# Patient Record
Sex: MALE | Race: WHITE | Employment: FULL TIME | URBAN - METROPOLITAN AREA
[De-identification: names, ages, dates, MRNs, and addresses within clinical notes are randomized per-mention and may not be internally consistent; named-entity substitution may affect disease eponyms.]

---

## 2021-12-15 ENCOUNTER — APPOINTMENT (OUTPATIENT)
Dept: CT IMAGING | Age: 56
End: 2021-12-15

## 2021-12-15 ENCOUNTER — APPOINTMENT (OUTPATIENT)
Dept: GENERAL RADIOLOGY | Age: 56
End: 2021-12-15

## 2021-12-15 ENCOUNTER — HOSPITAL ENCOUNTER (EMERGENCY)
Age: 56
Discharge: ANOTHER HOSPITAL | End: 2021-12-16

## 2021-12-15 DIAGNOSIS — R59.1 LYMPHADENOPATHY: ICD-10-CM

## 2021-12-15 DIAGNOSIS — J84.10 GRANULOMATOUS LUNG DISEASE (HCC): ICD-10-CM

## 2021-12-15 DIAGNOSIS — R10.9 ABDOMINAL PAIN, UNSPECIFIED ABDOMINAL LOCATION: ICD-10-CM

## 2021-12-15 DIAGNOSIS — J34.89 MASS OF SPHENOID SINUS: Primary | ICD-10-CM

## 2021-12-15 DIAGNOSIS — K92.0 HEMATEMESIS, PRESENCE OF NAUSEA NOT SPECIFIED: ICD-10-CM

## 2021-12-15 LAB
ALBUMIN SERPL-MCNC: 4.2 GM/DL (ref 3.4–5)
ALP BLD-CCNC: 72 IU/L (ref 40–129)
ALT SERPL-CCNC: 14 U/L (ref 10–40)
ANION GAP SERPL CALCULATED.3IONS-SCNC: 10 MMOL/L (ref 4–16)
APTT: 30.6 SECONDS (ref 25.1–37.1)
AST SERPL-CCNC: 22 IU/L (ref 15–37)
BASOPHILS ABSOLUTE: 0.1 K/CU MM
BASOPHILS RELATIVE PERCENT: 0.5 % (ref 0–1)
BILIRUB SERPL-MCNC: 0.2 MG/DL (ref 0–1)
BUN BLDV-MCNC: 12 MG/DL (ref 6–23)
CALCIUM SERPL-MCNC: 9.3 MG/DL (ref 8.3–10.6)
CHLORIDE BLD-SCNC: 99 MMOL/L (ref 99–110)
CO2: 24 MMOL/L (ref 21–32)
CREAT SERPL-MCNC: 1.1 MG/DL (ref 0.9–1.3)
DIFFERENTIAL TYPE: ABNORMAL
EOSINOPHILS ABSOLUTE: 0.1 K/CU MM
EOSINOPHILS RELATIVE PERCENT: 0.4 % (ref 0–3)
GFR AFRICAN AMERICAN: >60 ML/MIN/1.73M2
GFR NON-AFRICAN AMERICAN: >60 ML/MIN/1.73M2
GLUCOSE BLD-MCNC: 116 MG/DL (ref 70–99)
HCT VFR BLD CALC: 46.8 % (ref 42–52)
HEMOGLOBIN: 14.8 GM/DL (ref 13.5–18)
IMMATURE NEUTROPHIL %: 0.4 % (ref 0–0.43)
INR BLD: 0.92 INDEX
LYMPHOCYTES ABSOLUTE: 1.8 K/CU MM
LYMPHOCYTES RELATIVE PERCENT: 13.9 % (ref 24–44)
MCH RBC QN AUTO: 25.9 PG (ref 27–31)
MCHC RBC AUTO-ENTMCNC: 31.6 % (ref 32–36)
MCV RBC AUTO: 81.8 FL (ref 78–100)
MONOCYTES ABSOLUTE: 1.6 K/CU MM
MONOCYTES RELATIVE PERCENT: 12.2 % (ref 0–4)
NUCLEATED RBC %: 0 %
PDW BLD-RTO: 18 % (ref 11.7–14.9)
PLATELET # BLD: 495 K/CU MM (ref 140–440)
PMV BLD AUTO: 9.5 FL (ref 7.5–11.1)
POTASSIUM SERPL-SCNC: 4.4 MMOL/L (ref 3.5–5.1)
PROTHROMBIN TIME: 11.8 SECONDS (ref 11.7–14.5)
RBC # BLD: 5.72 M/CU MM (ref 4.6–6.2)
SEGMENTED NEUTROPHILS ABSOLUTE COUNT: 9.3 K/CU MM
SEGMENTED NEUTROPHILS RELATIVE PERCENT: 72.6 % (ref 36–66)
SODIUM BLD-SCNC: 133 MMOL/L (ref 135–145)
TOTAL IMMATURE NEUTOROPHIL: 0.05 K/CU MM
TOTAL NUCLEATED RBC: 0 K/CU MM
TOTAL PROTEIN: 7.5 GM/DL (ref 6.4–8.2)
TROPONIN T: <0.01 NG/ML
WBC # BLD: 12.8 K/CU MM (ref 4–10.5)

## 2021-12-15 PROCEDURE — 96375 TX/PRO/DX INJ NEW DRUG ADDON: CPT

## 2021-12-15 PROCEDURE — 85730 THROMBOPLASTIN TIME PARTIAL: CPT

## 2021-12-15 PROCEDURE — 6360000002 HC RX W HCPCS: Performed by: PHYSICIAN ASSISTANT

## 2021-12-15 PROCEDURE — 74177 CT ABD & PELVIS W/CONTRAST: CPT

## 2021-12-15 PROCEDURE — C9113 INJ PANTOPRAZOLE SODIUM, VIA: HCPCS | Performed by: PHYSICIAN ASSISTANT

## 2021-12-15 PROCEDURE — 80053 COMPREHEN METABOLIC PANEL: CPT

## 2021-12-15 PROCEDURE — 84484 ASSAY OF TROPONIN QUANT: CPT

## 2021-12-15 PROCEDURE — 85610 PROTHROMBIN TIME: CPT

## 2021-12-15 PROCEDURE — 6360000004 HC RX CONTRAST MEDICATION: Performed by: PHYSICIAN ASSISTANT

## 2021-12-15 PROCEDURE — 99285 EMERGENCY DEPT VISIT HI MDM: CPT

## 2021-12-15 PROCEDURE — 85025 COMPLETE CBC W/AUTO DIFF WBC: CPT

## 2021-12-15 PROCEDURE — 71275 CT ANGIOGRAPHY CHEST: CPT

## 2021-12-15 PROCEDURE — 96374 THER/PROPH/DIAG INJ IV PUSH: CPT

## 2021-12-15 PROCEDURE — 71045 X-RAY EXAM CHEST 1 VIEW: CPT

## 2021-12-15 PROCEDURE — 2580000003 HC RX 258: Performed by: PHYSICIAN ASSISTANT

## 2021-12-15 PROCEDURE — 83690 ASSAY OF LIPASE: CPT

## 2021-12-15 PROCEDURE — 70450 CT HEAD/BRAIN W/O DYE: CPT

## 2021-12-15 PROCEDURE — 36415 COLL VENOUS BLD VENIPUNCTURE: CPT

## 2021-12-15 RX ORDER — DIPHENHYDRAMINE HYDROCHLORIDE 50 MG/ML
50 INJECTION INTRAMUSCULAR; INTRAVENOUS ONCE
Status: COMPLETED | OUTPATIENT
Start: 2021-12-15 | End: 2021-12-15

## 2021-12-15 RX ORDER — 0.9 % SODIUM CHLORIDE 0.9 %
1000 INTRAVENOUS SOLUTION INTRAVENOUS ONCE
Status: COMPLETED | OUTPATIENT
Start: 2021-12-15 | End: 2021-12-16

## 2021-12-15 RX ORDER — METOCLOPRAMIDE HYDROCHLORIDE 5 MG/ML
10 INJECTION INTRAMUSCULAR; INTRAVENOUS ONCE
Status: COMPLETED | OUTPATIENT
Start: 2021-12-15 | End: 2021-12-15

## 2021-12-15 RX ORDER — PANTOPRAZOLE SODIUM 40 MG/10ML
40 INJECTION, POWDER, LYOPHILIZED, FOR SOLUTION INTRAVENOUS ONCE
Status: COMPLETED | OUTPATIENT
Start: 2021-12-15 | End: 2021-12-15

## 2021-12-15 RX ADMIN — IOPAMIDOL 80 ML: 755 INJECTION, SOLUTION INTRAVENOUS at 23:13

## 2021-12-15 RX ADMIN — PANTOPRAZOLE SODIUM 40 MG: 40 INJECTION, POWDER, FOR SOLUTION INTRAVENOUS at 21:55

## 2021-12-15 RX ADMIN — METOCLOPRAMIDE 10 MG: 5 INJECTION, SOLUTION INTRAMUSCULAR; INTRAVENOUS at 21:55

## 2021-12-15 RX ADMIN — SODIUM CHLORIDE 1000 ML: 9 INJECTION, SOLUTION INTRAVENOUS at 21:52

## 2021-12-15 RX ADMIN — DIPHENHYDRAMINE HYDROCHLORIDE 50 MG: 50 INJECTION, SOLUTION INTRAMUSCULAR; INTRAVENOUS at 21:56

## 2021-12-15 ASSESSMENT — PAIN SCALES - GENERAL: PAINLEVEL_OUTOF10: 9

## 2021-12-15 ASSESSMENT — PAIN DESCRIPTION - LOCATION: LOCATION: HEAD

## 2021-12-15 NOTE — ED PROVIDER NOTES
12 lead EKG per my interpretation:  Sinus bradycardia at 58  Axis is   Normal  QTc is  within an acceptable range  There is no specific T wave changes appreciated. There is no specific ST wave changes appreciated. No STEMI    Prior EKG to compare with was NOT available.     MD Alexa Bañuelos MD  12/15/21 0317

## 2021-12-16 ENCOUNTER — HOSPITAL ENCOUNTER (INPATIENT)
Age: 56
LOS: 6 days | Discharge: HOME | DRG: 143 | End: 2021-12-22
Attending: INTERNAL MEDICINE | Admitting: INTERNAL MEDICINE

## 2021-12-16 VITALS
SYSTOLIC BLOOD PRESSURE: 139 MMHG | RESPIRATION RATE: 16 BRPM | HEART RATE: 72 BPM | HEIGHT: 73 IN | OXYGEN SATURATION: 95 % | TEMPERATURE: 97.6 F | WEIGHT: 200 LBS | DIASTOLIC BLOOD PRESSURE: 90 MMHG | BODY MASS INDEX: 26.51 KG/M2

## 2021-12-16 PROBLEM — E23.6 PITUITARY MASS (HCC): Status: ACTIVE | Noted: 2021-12-16

## 2021-12-16 LAB — LIPASE: 44 IU/L (ref 13–60)

## 2021-12-16 PROCEDURE — 96376 TX/PRO/DX INJ SAME DRUG ADON: CPT

## 2021-12-16 PROCEDURE — 2000000000 HC ICU R&B

## 2021-12-16 PROCEDURE — 96375 TX/PRO/DX INJ NEW DRUG ADDON: CPT

## 2021-12-16 PROCEDURE — 6360000002 HC RX W HCPCS: Performed by: NURSE PRACTITIONER

## 2021-12-16 PROCEDURE — 2060000000 HC ICU INTERMEDIATE R&B

## 2021-12-16 PROCEDURE — 6360000002 HC RX W HCPCS: Performed by: PHYSICIAN ASSISTANT

## 2021-12-16 RX ORDER — SODIUM CHLORIDE 0.9 % (FLUSH) 0.9 %
10 SYRINGE (ML) INJECTION EVERY 12 HOURS SCHEDULED
Status: DISCONTINUED | OUTPATIENT
Start: 2021-12-16 | End: 2021-12-22 | Stop reason: HOSPADM

## 2021-12-16 RX ORDER — MORPHINE SULFATE 2 MG/ML
4 INJECTION, SOLUTION INTRAMUSCULAR; INTRAVENOUS
Status: DISCONTINUED | OUTPATIENT
Start: 2021-12-16 | End: 2021-12-16 | Stop reason: HOSPADM

## 2021-12-16 RX ORDER — POLYETHYLENE GLYCOL 3350 17 G/17G
17 POWDER, FOR SOLUTION ORAL DAILY PRN
Status: DISCONTINUED | OUTPATIENT
Start: 2021-12-16 | End: 2021-12-22 | Stop reason: HOSPADM

## 2021-12-16 RX ORDER — SODIUM CHLORIDE 9 MG/ML
25 INJECTION, SOLUTION INTRAVENOUS PRN
Status: DISCONTINUED | OUTPATIENT
Start: 2021-12-16 | End: 2021-12-22 | Stop reason: HOSPADM

## 2021-12-16 RX ORDER — METOCLOPRAMIDE HYDROCHLORIDE 5 MG/ML
10 INJECTION INTRAMUSCULAR; INTRAVENOUS ONCE
Status: COMPLETED | OUTPATIENT
Start: 2021-12-16 | End: 2021-12-16

## 2021-12-16 RX ORDER — ONDANSETRON 2 MG/ML
4 INJECTION INTRAMUSCULAR; INTRAVENOUS EVERY 4 HOURS PRN
Status: DISCONTINUED | OUTPATIENT
Start: 2021-12-16 | End: 2021-12-22 | Stop reason: HOSPADM

## 2021-12-16 RX ORDER — ACETAMINOPHEN 650 MG/1
650 SUPPOSITORY RECTAL EVERY 4 HOURS PRN
Status: DISCONTINUED | OUTPATIENT
Start: 2021-12-16 | End: 2021-12-22 | Stop reason: HOSPADM

## 2021-12-16 RX ORDER — SODIUM CHLORIDE 0.9 % (FLUSH) 0.9 %
10 SYRINGE (ML) INJECTION PRN
Status: DISCONTINUED | OUTPATIENT
Start: 2021-12-16 | End: 2021-12-22 | Stop reason: HOSPADM

## 2021-12-16 RX ORDER — DIPHENHYDRAMINE HYDROCHLORIDE 50 MG/ML
25 INJECTION INTRAMUSCULAR; INTRAVENOUS ONCE
Status: COMPLETED | OUTPATIENT
Start: 2021-12-16 | End: 2021-12-16

## 2021-12-16 RX ORDER — SODIUM CHLORIDE 9 MG/ML
INJECTION, SOLUTION INTRAVENOUS CONTINUOUS
Status: DISCONTINUED | OUTPATIENT
Start: 2021-12-16 | End: 2021-12-22 | Stop reason: HOSPADM

## 2021-12-16 RX ORDER — ACETAMINOPHEN 325 MG/1
650 TABLET ORAL EVERY 4 HOURS PRN
Status: DISCONTINUED | OUTPATIENT
Start: 2021-12-16 | End: 2021-12-22 | Stop reason: HOSPADM

## 2021-12-16 RX ADMIN — METOCLOPRAMIDE 10 MG: 5 INJECTION, SOLUTION INTRAMUSCULAR; INTRAVENOUS at 13:16

## 2021-12-16 RX ADMIN — MORPHINE SULFATE 4 MG: 2 INJECTION, SOLUTION INTRAMUSCULAR; INTRAVENOUS at 09:15

## 2021-12-16 RX ADMIN — MORPHINE SULFATE 4 MG: 2 INJECTION, SOLUTION INTRAMUSCULAR; INTRAVENOUS at 19:18

## 2021-12-16 RX ADMIN — MORPHINE SULFATE 4 MG: 2 INJECTION, SOLUTION INTRAMUSCULAR; INTRAVENOUS at 04:35

## 2021-12-16 RX ADMIN — DIPHENHYDRAMINE HYDROCHLORIDE 25 MG: 50 INJECTION INTRAMUSCULAR; INTRAVENOUS at 13:16

## 2021-12-16 ASSESSMENT — PAIN SCALES - GENERAL
PAINLEVEL_OUTOF10: 9
PAINLEVEL_OUTOF10: 6
PAINLEVEL_OUTOF10: 8

## 2021-12-16 NOTE — ED NOTES
Larry Haynes from [facility] called to notify that they still have not found a bed yet to transfer pt.      Faizan Resendiz  12/16/21 114

## 2021-12-16 NOTE — ED NOTES
[address] [facility], await call back for ENT   [facility] transfer [facility] @[id number]  [facility] spoke to Annie @[phone] for ENT placement  [facility] states they tried [facility] no ENT available   [facility] ENT called @5901 spoke to Cite Abdirahman Poole

## 2021-12-16 NOTE — ED PROVIDER NOTES
6:43 AM EST Ritchie Levy Lanes was checked out to me Dmitry Both CNP at bedside by Roberto PEÑALOZA at 0600. Please see his/her initial documentation for details of the patient's ED presentation, physical exam and completed studies. At time of patient signout, acceptance bed availability with unknown timeframe. In brief, Romain Miles is a  who presented for headache for 4 days he rated 9/10. He also had some chest and abdominal pain  3 nights ago. The chest pain was central and the abdominal pain was diffuse. He had an episode of hematemesis last night. He denied NSAID use, however did have some binge drinking last week. Last drink 4 days ago. REVIEW OF SYSTEMS    Constitutional:  Denies fever, chills, weight loss or weakness   HENT:  Denies sore throat or ear pain   Cardiovascular: See HPI  Respiratory:  Denies cough or shortness of breath    GI: See HPI  :  Denies any urinary symptoms  Musculoskeletal:  Denies back pain,   Skin:  Denies rash  Neurologic: See HPI  Endocrine:  Denies polyuria or polydypsia   Lymphatic:  Denies swollen glands     All other review of systems are negative  See HPI and nursing notes for additional information       PHYSICAL EXAM    VITAL SIGNS: /80   Pulse 60   Temp 97.6 °F (36.4 °C) (Oral)   Resp 18   Ht 6' 1\" (1.854 m)   Wt 200 lb (90.7 kg)   SpO2 98%   BMI 26.39 kg/m²    Constitutional:  Well developed, Well nourished  HENT:  Normocephalic, Atraumatic, PERRL. EOMI. Sclera clear. Conjunctiva normal, No discharge. Neck/Lymphatics: supple, no JVD, no swollen nodes  Cardiovascular:  Rate regular, Normal Rhythm,  no murmurs/rubs/gallops. .  No JVD  Respiratory:  Nonlabored breathing. Normal breath sounds, No wheezing  Abdomen: Bowel sounds normal, Soft, No tenderness, no masses. Musculoskeletal:    There is no edema, asymmetry, or calf / thigh tenderness bilaterally. No cyanosis. No cool or pale-appearing limb.   Distal cap refill and pulses intact bilateral upper and lower extremities  Bilateral upper and lower extremity ROM intact without pain or obvious deficit  Integument:  Warm, Dry  Neurologic: Alert & oriented , No focal deficits noted. Cranial nerves II through XII grossly intact. Normal gross motor coordination & motor strength bilateral upper and lower extremities  Sensation intact. Psychiatric:  Affect normal, Mood normal.       I have reviewed and interpreted all of the currently available lab/imaging results from this visit (if applicable):  LABS:  CT ABDOMEN PELVIS W IV CONTRAST Additional Contrast? None   Final Result   There is focal irregular appearing submucosal edema in the region of the   pyloric antrum, with mild adjacent haziness as well as a small focus of gas   which may represent air within a peptic ulcer. This would be better assessed   with endoscopy. There are several clustered nodule seen in a branching type pattern within   the lungs bilaterally, most prominent in the right middle lobe. This right   middle lobe nodular process has the focal largest nodule measuring 2.2 x 1.8   cm in size. Given the appearance and location, chronic infection and/or   granulomatous process is favored, with associated right-sided hilar   lymphadenopathy. A neoplastic process is felt less likely. Follow-up   recommendations as below. Given overall findings, could consider pulmonology   consultation regarding workup and management. Large areas of right neck lymphadenopathy, partially imaged. Recommend   dedicated CT imaging of the neck with IV contrast for further evaluation. Lymphadenopathy in the neck would be amenable to percutaneous biopsy. RECOMMENDATIONS:   Fleischner Society guidelines for follow-up and management of incidentally   detected pulmonary nodules:      Multiple Solid Nodules:      Nodule size greater than 8 mm   In a low-risk patient, CT at 3-6 months, then consider CT at 18-24 months.    In include individuals with a history or smoking or known   risk factors. Radiology 2017 http://pubs. rsna.org/doi/full/10.1148/radiol. 3650082026         CT HEAD WO CONTRAST   Final Result   1. No acute intracranial abnormality. 2. 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 Recommend MRI with and without contrast using pituitary mass protocol to   ensure adequate imaging coverage. XR CHEST PORTABLE   Final Result   No acute process. IMAGING:  CT ABDOMEN PELVIS W IV CONTRAST Additional Contrast? None   Final Result   There is focal irregular appearing submucosal edema in the region of the   pyloric antrum, with mild adjacent haziness as well as a small focus of gas   which may represent air within a peptic ulcer. This would be better assessed   with endoscopy. There are several clustered nodule seen in a branching type pattern within   the lungs bilaterally, most prominent in the right middle lobe. This right   middle lobe nodular process has the focal largest nodule measuring 2.2 x 1.8   cm in size. Given the appearance and location, chronic infection and/or   granulomatous process is favored, with associated right-sided hilar   lymphadenopathy. A neoplastic process is felt less likely. Follow-up   recommendations as below. Given overall findings, could consider pulmonology   consultation regarding workup and management. Large areas of right neck lymphadenopathy, partially imaged. Recommend   dedicated CT imaging of the neck with IV contrast for further evaluation. Lymphadenopathy in the neck would be amenable to percutaneous biopsy.       RECOMMENDATIONS:   Fleischner Society guidelines for follow-up and management of incidentally   detected pulmonary nodules:      Multiple Solid Nodules:      Nodule size greater than 8 mm   In a low-risk patient, CT at 3-6 months, then consider CT at 18-24 months. In a high-risk patient, CT at 3-6 months, then CT at 18-24 months. - Low risk patients include individuals with minimal or absent history of   smoking and other known risk factors. - High risk patients include individuals with a history or smoking or known   risk factors. Radiology 2017 http://pubs. rsna.org/doi/full/10.1148/radiol. 3307753057         CTA PULMONARY W CONTRAST   Final Result   There is focal irregular appearing submucosal edema in the region of the   pyloric antrum, with mild adjacent haziness as well as a small focus of gas   which may represent air within a peptic ulcer. This would be better assessed   with endoscopy. There are several clustered nodule seen in a branching type pattern within   the lungs bilaterally, most prominent in the right middle lobe. This right   middle lobe nodular process has the focal largest nodule measuring 2.2 x 1.8   cm in size. Given the appearance and location, chronic infection and/or   granulomatous process is favored, with associated right-sided hilar   lymphadenopathy. A neoplastic process is felt less likely. Follow-up   recommendations as below. Given overall findings, could consider pulmonology   consultation regarding workup and management. Large areas of right neck lymphadenopathy, partially imaged. Recommend   dedicated CT imaging of the neck with IV contrast for further evaluation. Lymphadenopathy in the neck would be amenable to percutaneous biopsy. RECOMMENDATIONS:   Fleischner Society guidelines for follow-up and management of incidentally   detected pulmonary nodules:      Multiple Solid Nodules:      Nodule size greater than 8 mm   In a low-risk patient, CT at 3-6 months, then consider CT at 18-24 months. In a high-risk patient, CT at 3-6 months, then CT at 18-24 months.       - Low risk patients include individuals with minimal or absent history of   smoking and other sphenoid sinuses and clivus with suspected invasion of the sella turcica and left cavernous sinus. We do not have ENT available at our facility for consultation. Transfer was initiated by ANABELA Sierra. [facility] [place]  with Dr. Monica Galvan as accepting once a bed is available. 1330: Bed available for patient. He is being transferred in stable condition. Vital signs and nursing notes reviewed during ED course. I have independently evaluated this patient . Supervising physician present in the Emergency Department, available for consultation, throughout entirety of  patient care. At bedside I reviewed any applicable labs/imaging, the treatment plan, and time was allotted for questions. Final Impression:  1. Mass of sphenoid sinus    2. Lymphadenopathy    3.  Granulomatous lung disease ([address]    4. Abdominal pain, unspecified abdominal location        (Please note that portions of this note may have been completed with a voice recognition program. Efforts were made to edit the dictations but occasionally words are mis-transcribed.)    MARIA ALEJANDRA Luke - MARIA ALEJANDRA Garcia CNP  12/16/21 6817

## 2021-12-16 NOTE — ED NOTES
Bed: ED-31  Expected date:   Expected time:   Means of arrival:   Comments:  THEO Olson  12/16/21 6177

## 2021-12-16 NOTE — ED NOTES
Pt requesting pain medication for headache - ANABELA Álvarez notified     Lopez Valdovinos RN  12/16/21 4400

## 2021-12-16 NOTE — ED NOTES
[address] [facility]) called with bed placement at [facility] in [facility] with nurse to nurse report . Valeria Jose is working on finding transportation for pt.       Arthuro Duane  12/16/21 0116

## 2021-12-16 NOTE — ED PROVIDER NOTES
Emergency [address] EMERGENCY DEPARTMENT    Patient: Romain Miles  MRN: [id number]  : 1965  Date of Evaluation: 12/15/2021  ED Provider: Mckenzie Vicente PA-C    Chief Complaint       Chief Complaint   Patient presents with    Headache     chills and hot spells, headache, chest pain, hematemesis    Chest Pain     feels like heart burn     Hematemesis       [id number] Zoran Pittman is a 60 y.o. male who presents to the emergency department for a headache, chest pain, abdominal pain. Patient states headache began 4 days ago. Localized to the left frontal aspect of the head. Constant, aching, severity 9/10. No aggravating factors, no relief with Excedrin. Denies any fever, chills, neck pain or stiffness, weakness, numbness, tingling. Chest and abdominal pain began 3 days ago. Central chest and diffuse abdominal pain. He reports hematemesis that began last night. Patient admits to binge drinking last week, states last drink was 4 days ago. Denies frequent NSAID use. He denies SOB, cough, congestion. ROS     CONSTITUTIONAL:  Denies fever. EYES:  Denies visual changes. HEAD:  + headache. ENT:  Denies earache, nasal congestion, sore throat. NECK:  Denies neck pain. RESPIRATORY:  Denies any shortness of breath. CARDIOVASCULAR:  + chest pain. GI:  + abd pain, hematemesis. :  Denies urinary symptoms. MUSCULOSKELETAL:  Denies extremity pain or swelling. BACK:  Denies back pain. INTEGUMENT:  Denies skin changes. LYMPHATIC:  Denies lymphadenopathy. NEUROLOGIC:  Denies any numbness/tingling. Past History   History reviewed. No pertinent past medical history. History reviewed. No pertinent surgical history.   Social History     Socioeconomic History    Marital status: Single     Spouse name: None    Number of children: None    Years of education: None    Highest education level: None   Occupational History    None Tobacco Use    Smoking status: Former Smoker    Smokeless tobacco: Never Used   Substance and Sexual Activity    Alcohol use: Not Currently    Drug use: Not Currently    Sexual activity: None   Other Topics Concern    None   Social History Narrative    None     Social Determinants of Health     Financial Resource Strain:     Difficulty of Paying Living Expenses: Not on file   Food Insecurity:     Worried About Running Out of Food in the Last Year: Not on file    Moiz of Food in the Last Year: Not on file   Transportation Needs:     Lack of Transportation (Medical): Not on file    Lack of Transportation (Non-Medical): Not on file   Physical Activity:     Days of Exercise per Week: Not on file    Minutes of Exercise per Session: Not on file   Stress:     Feeling of Stress : Not on file   Social Connections:     Frequency of Communication with Friends and Family: Not on file    Frequency of Social Gatherings with Friends and Family: Not on file    Attends Denominational Services: Not on file    Active Member of [address] [place] [address] or Organizations: Not on file    Attends Club or Organization Meetings: Not on file    Marital Status: Not on file   Intimate Partner Violence:     Fear of Current or Ex-Partner: Not on file    Emotionally Abused: Not on file    Physically Abused: Not on file    Sexually Abused: Not on file   Housing Stability:     Unable to Pay for Housing in the Last Year: Not on file    Number of [place] in the Last Year: Not on file    Unstable Housing in the Last Year: Not on file       Medications/Allergies     Previous Medications    No medications on file     No Known Allergies     Physical Exam       ED Triage Vitals [12/15/21 1725]   BP Temp Temp Source Pulse Resp SpO2 Height Weight   (!) 133/97 97.6 °F (36.4 °C) Oral 69 17 98 % 6' 1\" (1.854 m) 200 lb (90.7 kg)     GENERAL APPEARANCE:  Well-developed, well-nourished, no acute distress. HEAD:  NC/AT. EYES:  Sclera anicteric. ENT:  Ears, nose, mouth normal.     NECK:  Supple. CARDIO:  RRR. LUNGS:   CTAB. Respirations unlabored. ABDOMEN:  Soft, non-distended, non-tender. BS active. EXTREMITIES:  No acute deformities. SKIN:  Warm and dry. NEUROLOGICAL:  Alert and oriented. PSYCHIATRIC:  Normal mood.      Diagnostics     Labs:  Results for orders placed or performed during the hospital encounter of 12/15/21   CBC auto diff   Result Value Ref Range    WBC 12.8 (H) 4.0 - 10.5 K/CU MM    RBC 5.72 4.6 - 6.2 M/CU MM    Hemoglobin 14.8 13.5 - 18.0 GM/DL    Hematocrit 46.8 42 - 52 %    MCV 81.8 78 - 100 FL    MCH 25.9 (L) 27 - 31 PG    MCHC 31.6 (L) 32.0 - 36.0 %    RDW 18.0 (H) 11.7 - 14.9 %    Platelets 608 (H) 935 - 440 K/CU MM    MPV 9.5 7.5 - 11.1 FL    Differential Type AUTOMATED DIFFERENTIAL     Segs Relative 72.6 (H) 36 - 66 %    Lymphocytes % 13.9 (L) 24 - 44 %    Monocytes % 12.2 (H) 0 - 4 %    Eosinophils % 0.4 0 - 3 %    Basophils % 0.5 0 - 1 %    Segs Absolute 9.3 K/CU MM    Lymphocytes Absolute 1.8 K/CU MM    Monocytes Absolute 1.6 K/CU MM    Eosinophils Absolute 0.1 K/CU MM    Basophils Absolute 0.1 K/CU MM    Nucleated RBC % 0.0 %    Total Nucleated RBC 0.0 K/CU MM    Total Immature Neutrophil 0.05 K/CU MM    Immature Neutrophil % 0.4 0 - 0.43 %   CMP   Result Value Ref Range    Sodium 133 (L) 135 - 145 MMOL/L    Potassium 4.4 3.5 - 5.1 MMOL/L    Chloride 99 99 - 110 mMol/L    CO2 24 21 - 32 MMOL/L    BUN 12 6 - 23 MG/DL    CREATININE 1.1 0.9 - 1.3 MG/DL    Glucose 116 (H) 70 - 99 MG/DL    Calcium 9.3 8.3 - 10.6 MG/DL    Albumin 4.2 3.4 - 5.0 GM/DL    Total Protein 7.5 6.4 - 8.2 GM/DL    Total Bilirubin 0.2 0.0 - 1.0 MG/DL    ALT 14 10 - 40 U/L    AST 22 15 - 37 IU/L    Alkaline Phosphatase 72 40 - 129 IU/L    GFR Non-African American >60 >60 mL/min/1.73m2    GFR African American >60 >60 mL/min/1.73m2    Anion Gap 10 4 - 16   Troponin   Result Value Ref Range    Troponin T <0.010 <0.01 NG/ML   Protime/INR & PTT   Result Value Ref Range    Protime 11.8 11.7 - 14.5 SECONDS    INR 0.92 INDEX    aPTT 30.6 25.1 - 37.1 SECONDS   Lipase   Result Value Ref Range    Lipase 44 13 - 60 IU/L       Radiographs:  CT HEAD WO CONTRAST    Result Date: 12/15/2021  EXAMINATION: CT OF THE HEAD WITHOUT CONTRAST  12/15/2021 8:15 pm TECHNIQUE: CT of the head was performed without the administration of intravenous contrast. Dose modulation, iterative reconstruction, and/or weight based adjustment of the mA/kV was utilized to reduce the radiation dose to as low as reasonably achievable. COMPARISON: None. HISTORY: ORDERING SYSTEM PROVIDED HISTORY: TWIs on EKG, HA TECHNOLOGIST PROVIDED HISTORY: Reason for exam:->TWIs on EKG, HA Has a \"code stroke\" or \"stroke alert\" been called? ->No Decision Support Exception - unselect if not a suspected or confirmed emergency medical condition->Emergency Medical Condition (MA) Reason for Exam: TWIs on EKG, HA Additional signs and symptoms: no Relevant Medical/Surgical History: none FINDINGS: BRAIN/VENTRICLES: There is no acute intracranial hemorrhage, mass effect or midline shift. No abnormal extra-axial fluid collection. The gray-white differentiation is maintained without evidence of an acute infarct. There is no evidence of hydrocephalus. ORBITS: The visualized portion of the orbits demonstrate no acute abnormality. SINUSES: Destructive soft tissue mass centered in the sphenoid sinuses involving the upper clivus and likely the sella turcica. There is partial invasion of the left cavernous sinus. No mastoid effusion. SOFT TISSUES/SKULL:  No acute osseous or soft tissue abnormality. Destructive central skull base mass as above. 1. No acute intracranial abnormality. 2. Destructive central skull base mass centered in the sphenoid sinuses and clivus with suspected invasion of the sella turcica and left cavernous sinus.  Recommend MRI with and without contrast using pituitary mass protocol to ensure adequate imaging coverage. CT ABDOMEN PELVIS W IV CONTRAST Additional Contrast? None    Result Date: 12/15/2021  EXAMINATION: CTA OF THE CHEST; CT OF THE ABDOMEN AND PELVIS WITH CONTRAST 12/15/2021 11:12 pm; 12/15/2021 11:13 pm: TECHNIQUE: CTA of the chest was performed after the administration of intravenous contrast.  Multiplanar reformatted images are provided for review. MIP images are provided for review. Dose modulation, iterative reconstruction, and/or weight based adjustment of the mA/kV was utilized to reduce the radiation dose to as low as reasonably achievable.; CT of the abdomen and pelvis was performed with the administration of intravenous contrast. Multiplanar reformatted images are provided for review. Dose modulation, iterative reconstruction, and/or weight based adjustment of the mA/kV was utilized to reduce the radiation dose to as low as reasonably achievable. COMPARISON: None. HISTORY: ORDERING SYSTEM PROVIDED HISTORY: chest pain, vomiting blood TECHNOLOGIST PROVIDED HISTORY: Reason for exam:->chest pain, vomiting blood Decision Support Exception - unselect if not a suspected or confirmed emergency medical condition->Emergency Medical Condition (MA) Reason for Exam: chest pain, vomiting blood Additional signs and symptoms: no Relevant Medical/Surgical History: 80 ml isovue 370 used; ORDERING SYSTEM PROVIDED HISTORY: pain, vomiting blood TECHNOLOGIST PROVIDED HISTORY: Additional Contrast?->None Reason for exam:->pain, vomiting blood Decision Support Exception - unselect if not a suspected or confirmed emergency medical condition->Emergency Medical Condition (MA) Reason for Exam: pain, vomiting blood Additional signs and symptoms: no Relevant Medical/Surgical History: 80 ml isovue 370 used FINDINGS: CTA CHEST: Pulmonary arteries[de-identified] The main pulmonary artery is well opacified, the pulmonary arteries are well opacified for evaluation. No pulmonary emboli are identified.   No significant pulmonary arterial enlargement is seen. Mediastinum: No thoracic aortic aneurysm is identified. Atherosclerotic calcifications are seen. Aortic valvular calcifications are identified as well as coronary artery atherosclerosis. No significant focal esophageal thickening is identified. Right hilar lymphadenopathy is identified. Lungs/Pleura: No pleural effusion is identified. There is a 4 mm ground-glass nodule identified in the right upper lobe, axial image 25 anteriorly. Several areas of clustered nodules in a branching pattern are seen within the lungs bilaterally, with the most prominent noted in the right middle lobe, on and around axial image 71 with focal subpleural consolidation. This has a nodular like appearance measuring 2.2 x 1.8 cm in size, axial image 73. No spiculated lung mass is identified to suggest a definite primary lung neoplasm. Soft Tissues/Bones: Within the partially imaged lower aspect of the right neck, there is lymphadenopathy seen with a 4 cm short axis lymph nodes seen on around axial image 16. There is also partial imaging of a right neck mass on axial image 1. No acute fracture is identified within the chest.  No axillary lymphadenopathy is identified. No left neck base lymphadenopathy is identified. Degenerative changes are seen in the thoracic spine as well as the lower cervical spine without fracture. Mild degenerative changes. CT ABDOMEN: Organs: There is a bilobed appearing cyst identified within the left hepatic lobe, segment 2. There is a small probable cyst seen in the right hepatic lobe, axial image 47, too small to characterize. No enhancing mass. No splenic mass. No discrete right adrenal mass is identified. There is a left adrenal nodule measuring at least 12 mm in size, not well evaluated. The gallbladder appears grossly unremarkable. No hyperdense calculi are identified. No pancreatic mass is identified. No peripancreatic inflammatory process.   No solid enhancing renal mass is identified. No hydroureteronephrosis is identified. GI/Bowel: The visualized loops of bowel appear normal in caliber. No ileus or obstruction is identified. No acute diverticulitis is identified. The appendix is noted in the left abdomen, with the cecum in the left upper quadrant. No obstruction is seen. This may be a normal variant in this patient. Focal irregular appearing submucosal edema seen within the gastric antrum with a small focus of gas seen on axial image 49 along the anterolateral aspect of the distal stomach suspicious for air within a peptic ulcer. Peritoneum/Retroperitoneum: No abdominal aortic aneurysm is identified. Atherosclerotic disease is identified. No aortic stenosis is identified. No bulky retroperitoneal lymphadenopathy is identified. CT PELVIS: Other: No pelvic mass is identified. No free fluid is identified in the pelvis. No bulky pelvic lymphadenopathy is identified. Atherosclerotic disease. Bones/Soft Tissues: No acute subcutaneous soft tissue abnormality is identified. No acute osseous abnormality. Degenerative changes are seen in the sacroiliac joints and spine. No osseous destructive process is identified. The greatest degree of degenerative changes seen at the level of L3-L4, L4-L5 and L5-S1. There is focal irregular appearing submucosal edema in the region of the pyloric antrum, with mild adjacent haziness as well as a small focus of gas which may represent air within a peptic ulcer. This would be better assessed with endoscopy. There are several clustered nodule seen in a branching type pattern within the lungs bilaterally, most prominent in the right middle lobe. This right middle lobe nodular process has the focal largest nodule measuring 2.2 x 1.8 cm in size. Given the appearance and location, chronic infection and/or granulomatous process is favored, with associated right-sided hilar lymphadenopathy. A neoplastic process is felt less likely. Follow-up recommendations as below. Given overall findings, could consider pulmonology consultation regarding workup and management. Large areas of right neck lymphadenopathy, partially imaged. Recommend dedicated CT imaging of the neck with IV contrast for further evaluation. Lymphadenopathy in the neck would be amenable to percutaneous biopsy. RECOMMENDATIONS: Fleischner Society guidelines for follow-up and management of incidentally detected pulmonary nodules: Multiple Solid Nodules: Nodule size greater than 8 mm In a low-risk patient, CT at 3-6 months, then consider CT at 18-24 months. In a high-risk patient, CT at 3-6 months, then CT at 18-24 months. - Low risk patients include individuals with minimal or absent history of smoking and other known risk factors. - High risk patients include individuals with a history or smoking or known risk factors. Radiology 2017 http://pubs. rsna.org/doi/full/10.1148/radiol. 4348231093     XR CHEST PORTABLE    Result Date: 12/15/2021  EXAMINATION: ONE XRAY VIEW OF THE CHEST 12/15/2021 6:03 pm COMPARISON: None. HISTORY: ORDERING SYSTEM PROVIDED HISTORY: chest pain, EKG changes TECHNOLOGIST PROVIDED HISTORY: Reason for exam:->chest pain, EKG changes Reason for Exam: chest pain   ekg changes   ha FINDINGS: The lungs are without acute focal process. There is no effusion or pneumothorax. The cardiomediastinal silhouette is without acute process. The osseous structures are without acute process. No acute process. CTA PULMONARY W CONTRAST    Result Date: 12/15/2021  EXAMINATION: CTA OF THE CHEST; CT OF THE ABDOMEN AND PELVIS WITH CONTRAST 12/15/2021 11:12 pm; 12/15/2021 11:13 pm: TECHNIQUE: CTA of the chest was performed after the administration of intravenous contrast.  Multiplanar reformatted images are provided for review. MIP images are provided for review.  Dose modulation, iterative reconstruction, and/or weight based adjustment of the mA/kV was utilized to reduce the radiation dose to as low as reasonably achievable.; CT of the abdomen and pelvis was performed with the administration of intravenous contrast. Multiplanar reformatted images are provided for review. Dose modulation, iterative reconstruction, and/or weight based adjustment of the mA/kV was utilized to reduce the radiation dose to as low as reasonably achievable. COMPARISON: None. HISTORY: ORDERING SYSTEM PROVIDED HISTORY: chest pain, vomiting blood TECHNOLOGIST PROVIDED HISTORY: Reason for exam:->chest pain, vomiting blood Decision Support Exception - unselect if not a suspected or confirmed emergency medical condition->Emergency Medical Condition (MA) Reason for Exam: chest pain, vomiting blood Additional signs and symptoms: no Relevant Medical/Surgical History: 80 ml isovue 370 used; ORDERING SYSTEM PROVIDED HISTORY: pain, vomiting blood TECHNOLOGIST PROVIDED HISTORY: Additional Contrast?->None Reason for exam:->pain, vomiting blood Decision Support Exception - unselect if not a suspected or confirmed emergency medical condition->Emergency Medical Condition (MA) Reason for Exam: pain, vomiting blood Additional signs and symptoms: no Relevant Medical/Surgical History: 80 ml isovue 370 used FINDINGS: CTA CHEST: Pulmonary arteries[de-identified] The main pulmonary artery is well opacified, the pulmonary arteries are well opacified for evaluation. No pulmonary emboli are identified. No significant pulmonary arterial enlargement is seen. Mediastinum: No thoracic aortic aneurysm is identified. Atherosclerotic calcifications are seen. Aortic valvular calcifications are identified as well as coronary artery atherosclerosis. No significant focal esophageal thickening is identified. Right hilar lymphadenopathy is identified. Lungs/Pleura: No pleural effusion is identified. There is a 4 mm ground-glass nodule identified in the right upper lobe, axial image 25 anteriorly.   Several areas of clustered nodules in a branching pattern are seen within the lungs bilaterally, with the most prominent noted in the right middle lobe, on and around axial image 71 with focal subpleural consolidation. This has a nodular like appearance measuring 2.2 x 1.8 cm in size, axial image 73. No spiculated lung mass is identified to suggest a definite primary lung neoplasm. Soft Tissues/Bones: Within the partially imaged lower aspect of the right neck, there is lymphadenopathy seen with a 4 cm short axis lymph nodes seen on around axial image 16. There is also partial imaging of a right neck mass on axial image 1. No acute fracture is identified within the chest.  No axillary lymphadenopathy is identified. No left neck base lymphadenopathy is identified. Degenerative changes are seen in the thoracic spine as well as the lower cervical spine without fracture. Mild degenerative changes. CT ABDOMEN: Organs: There is a bilobed appearing cyst identified within the left hepatic lobe, segment 2. There is a small probable cyst seen in the right hepatic lobe, axial image 47, too small to characterize. No enhancing mass. No splenic mass. No discrete right adrenal mass is identified. There is a left adrenal nodule measuring at least 12 mm in size, not well evaluated. The gallbladder appears grossly unremarkable. No hyperdense calculi are identified. No pancreatic mass is identified. No peripancreatic inflammatory process. No solid enhancing renal mass is identified. No hydroureteronephrosis is identified. GI/Bowel: The visualized loops of bowel appear normal in caliber. No ileus or obstruction is identified. No acute diverticulitis is identified. The appendix is noted in the left abdomen, with the cecum in the left upper quadrant. No obstruction is seen. This may be a normal variant in this patient.   Focal irregular appearing submucosal edema seen within the gastric antrum with a small focus of gas seen on axial image 49 along the anterolateral aspect of the distal stomach suspicious for air within a peptic ulcer. Peritoneum/Retroperitoneum: No abdominal aortic aneurysm is identified. Atherosclerotic disease is identified. No aortic stenosis is identified. No bulky retroperitoneal lymphadenopathy is identified. CT PELVIS: Other: No pelvic mass is identified. No free fluid is identified in the pelvis. No bulky pelvic lymphadenopathy is identified. Atherosclerotic disease. Bones/Soft Tissues: No acute subcutaneous soft tissue abnormality is identified. No acute osseous abnormality. Degenerative changes are seen in the sacroiliac joints and spine. No osseous destructive process is identified. The greatest degree of degenerative changes seen at the level of L3-L4, L4-L5 and L5-S1. There is focal irregular appearing submucosal edema in the region of the pyloric antrum, with mild adjacent haziness as well as a small focus of gas which may represent air within a peptic ulcer. This would be better assessed with endoscopy. There are several clustered nodule seen in a branching type pattern within the lungs bilaterally, most prominent in the right middle lobe. This right middle lobe nodular process has the focal largest nodule measuring 2.2 x 1.8 cm in size. Given the appearance and location, chronic infection and/or granulomatous process is favored, with associated right-sided hilar lymphadenopathy. A neoplastic process is felt less likely. Follow-up recommendations as below. Given overall findings, could consider pulmonology consultation regarding workup and management. Large areas of right neck lymphadenopathy, partially imaged. Recommend dedicated CT imaging of the neck with IV contrast for further evaluation. Lymphadenopathy in the neck would be amenable to percutaneous biopsy.  RECOMMENDATIONS: Fleischner Society guidelines for follow-up and management of incidentally detected pulmonary nodules: Multiple Solid Nodules: Nodule size greater than 8 mm In a low-risk patient, CT at 3-6 months, then consider CT at 18-24 months. In a high-risk patient, CT at 3-6 months, then CT at 18-24 months. - Low risk patients include individuals with minimal or absent history of smoking and other known risk factors. - High risk patients include individuals with a history or smoking or known risk factors. Radiology 2017 http://pubs. rsna.org/doi/full/10.1148/radiol. 6372541989       Procedures/EKG:   Please see Dr. Gramajo Dates note for interpretation. ED Course and MDM   -  Patient seen and evaluated in the emergency department. -  Triage and nursing notes reviewed and incorporated. -  Old chart records reviewed and incorporated. -  Supervising physician was Dr. Grayson Bhardwaj. Patient was seen independently. -  Work-up included:  See above  -  ED medications:  NS, Reglan, Benadryl, Protonix, morphine  -  Results discussed with patient. CT head shows a destructive mass of the sphenoid sinuses with invasion into the sella turcica. CT chest abd pelvis shows lymphadenopathy in the neck and chest, likely peptic ulcer, and granulomatous lungs. Labs are generally unremarkable. I consulted with Dr. Marianne Acosta, neurosurgery on staff, who recommends ENT evaluation for a mass in this location. Unfortunately, we do not have ENT on call this week. I consulted with [facility], specifically Dr. Alex Vaughan of ENT, who reports they are at capacity and cannot accept patient in transfer. However, he feels based on patient story that patient would benefit from a center such as [facility] for evaluation. [facility] is also on diversion and despite review by emergency triage committee, patient was declined transfer to their facility as well. -  I spoke with both the ENT (Dr. Seth Morales) and Neurosurgery(Dr. Vanessa Portillo) teams down at [facility] in [place] and both consultants are agreeable to see patient upon transfer. Dr. Kristin Mon, hospitalist, accepts patient in transfer. Awaiting bed at the end of my shift. Signed out to Sole Reis CNP. Please see her note for further details. In light of current events, I did utilize appropriate PPE (including N95 and surgical face mask, safety glasses, and gloves, as recommended by the health facility/national standard best practice, during my bedside interactions with the patient. Final Impression      1. Mass of sphenoid sinus    2. Lymphadenopathy    3.  Granulomatous lung disease ([address]    4. Abdominal pain, unspecified abdominal location            Aniya 25, PA-C  [address], [place]  12/16/21 [id number]

## 2021-12-17 ENCOUNTER — APPOINTMENT (OUTPATIENT)
Dept: CT IMAGING | Age: 56
DRG: 143 | End: 2021-12-17
Attending: INTERNAL MEDICINE

## 2021-12-17 ENCOUNTER — APPOINTMENT (OUTPATIENT)
Dept: MRI IMAGING | Age: 56
DRG: 143 | End: 2021-12-17
Attending: INTERNAL MEDICINE

## 2021-12-17 LAB
ANION GAP SERPL CALCULATED.3IONS-SCNC: 13 MMOL/L (ref 3–16)
BASOPHILS ABSOLUTE: 0.1 K/UL (ref 0–0.2)
BASOPHILS RELATIVE PERCENT: 0.5 %
BUN BLDV-MCNC: 16 MG/DL (ref 7–20)
CALCIUM SERPL-MCNC: 9.4 MG/DL (ref 8.3–10.6)
CHLORIDE BLD-SCNC: 97 MMOL/L (ref 99–110)
CO2: 24 MMOL/L (ref 21–32)
CREAT SERPL-MCNC: 0.8 MG/DL (ref 0.9–1.3)
EOSINOPHILS ABSOLUTE: 0 K/UL (ref 0–0.6)
EOSINOPHILS RELATIVE PERCENT: 0.2 %
FOLLICLE STIMULATING HORMONE: 3.3 MIU/ML
GFR AFRICAN AMERICAN: >60
GFR NON-AFRICAN AMERICAN: >60
GLUCOSE BLD-MCNC: 99 MG/DL (ref 70–99)
HCT VFR BLD CALC: 43.2 % (ref 40.5–52.5)
HEMOGLOBIN: 14.3 G/DL (ref 13.5–17.5)
LUTEINIZING HORMONE: 2.9 MIU/ML
LYMPHOCYTES ABSOLUTE: 1.3 K/UL (ref 1–5.1)
LYMPHOCYTES RELATIVE PERCENT: 11 %
MCH RBC QN AUTO: 26.5 PG (ref 26–34)
MCHC RBC AUTO-ENTMCNC: 33.1 G/DL (ref 31–36)
MCV RBC AUTO: 80.1 FL (ref 80–100)
MONOCYTES ABSOLUTE: 1.2 K/UL (ref 0–1.3)
MONOCYTES RELATIVE PERCENT: 9.9 %
NEUTROPHILS ABSOLUTE: 9.2 K/UL (ref 1.7–7.7)
NEUTROPHILS RELATIVE PERCENT: 78.4 %
PDW BLD-RTO: 18.2 % (ref 12.4–15.4)
PLATELET # BLD: 451 K/UL (ref 135–450)
PMV BLD AUTO: 8 FL (ref 5–10.5)
POTASSIUM REFLEX MAGNESIUM: 4.3 MMOL/L (ref 3.5–5.1)
PROLACTIN: 22.3 NG/ML
RBC # BLD: 5.39 M/UL (ref 4.2–5.9)
SODIUM BLD-SCNC: 134 MMOL/L (ref 136–145)
TSH REFLEX FT4: 0.97 UIU/ML (ref 0.27–4.2)
WBC # BLD: 11.8 K/UL (ref 4–11)

## 2021-12-17 PROCEDURE — 70486 CT MAXILLOFACIAL W/O DYE: CPT

## 2021-12-17 PROCEDURE — 84146 ASSAY OF PROLACTIN: CPT

## 2021-12-17 PROCEDURE — 70496 CT ANGIOGRAPHY HEAD: CPT

## 2021-12-17 PROCEDURE — 2060000000 HC ICU INTERMEDIATE R&B

## 2021-12-17 PROCEDURE — 6370000000 HC RX 637 (ALT 250 FOR IP): Performed by: INTERNAL MEDICINE

## 2021-12-17 PROCEDURE — A9579 GAD-BASE MR CONTRAST NOS,1ML: HCPCS | Performed by: NURSE PRACTITIONER

## 2021-12-17 PROCEDURE — 83002 ASSAY OF GONADOTROPIN (LH): CPT

## 2021-12-17 PROCEDURE — 99253 IP/OBS CNSLTJ NEW/EST LOW 45: CPT | Performed by: OTOLARYNGOLOGY

## 2021-12-17 PROCEDURE — 70553 MRI BRAIN STEM W/O & W/DYE: CPT

## 2021-12-17 PROCEDURE — 84403 ASSAY OF TOTAL TESTOSTERONE: CPT

## 2021-12-17 PROCEDURE — 6360000002 HC RX W HCPCS: Performed by: INTERNAL MEDICINE

## 2021-12-17 PROCEDURE — 6360000004 HC RX CONTRAST MEDICATION: Performed by: NEUROLOGICAL SURGERY

## 2021-12-17 PROCEDURE — 84305 ASSAY OF SOMATOMEDIN: CPT

## 2021-12-17 PROCEDURE — C9113 INJ PANTOPRAZOLE SODIUM, VIA: HCPCS | Performed by: STUDENT IN AN ORGANIZED HEALTH CARE EDUCATION/TRAINING PROGRAM

## 2021-12-17 PROCEDURE — 80048 BASIC METABOLIC PNL TOTAL CA: CPT

## 2021-12-17 PROCEDURE — 83001 ASSAY OF GONADOTROPIN (FSH): CPT

## 2021-12-17 PROCEDURE — 84270 ASSAY OF SEX HORMONE GLOBUL: CPT

## 2021-12-17 PROCEDURE — 36415 COLL VENOUS BLD VENIPUNCTURE: CPT

## 2021-12-17 PROCEDURE — 83003 ASSAY GROWTH HORMONE (HGH): CPT

## 2021-12-17 PROCEDURE — 6360000004 HC RX CONTRAST MEDICATION: Performed by: NURSE PRACTITIONER

## 2021-12-17 PROCEDURE — 85025 COMPLETE CBC W/AUTO DIFF WBC: CPT

## 2021-12-17 PROCEDURE — 2580000003 HC RX 258: Performed by: INTERNAL MEDICINE

## 2021-12-17 PROCEDURE — 84443 ASSAY THYROID STIM HORMONE: CPT

## 2021-12-17 PROCEDURE — 6360000002 HC RX W HCPCS: Performed by: STUDENT IN AN ORGANIZED HEALTH CARE EDUCATION/TRAINING PROGRAM

## 2021-12-17 RX ORDER — PANTOPRAZOLE SODIUM 40 MG/10ML
40 INJECTION, POWDER, LYOPHILIZED, FOR SOLUTION INTRAVENOUS DAILY
Status: DISCONTINUED | OUTPATIENT
Start: 2021-12-17 | End: 2021-12-22 | Stop reason: HOSPADM

## 2021-12-17 RX ORDER — MORPHINE SULFATE 2 MG/ML
2 INJECTION, SOLUTION INTRAMUSCULAR; INTRAVENOUS EVERY 4 HOURS PRN
Status: DISCONTINUED | OUTPATIENT
Start: 2021-12-17 | End: 2021-12-22 | Stop reason: HOSPADM

## 2021-12-17 RX ORDER — DIPHENHYDRAMINE HCL 25 MG
25 TABLET ORAL EVERY 4 HOURS PRN
Status: DISCONTINUED | OUTPATIENT
Start: 2021-12-17 | End: 2021-12-22 | Stop reason: HOSPADM

## 2021-12-17 RX ORDER — PROMETHAZINE HYDROCHLORIDE 25 MG/ML
25 INJECTION, SOLUTION INTRAMUSCULAR; INTRAVENOUS EVERY 4 HOURS PRN
Status: DISCONTINUED | OUTPATIENT
Start: 2021-12-17 | End: 2021-12-22 | Stop reason: HOSPADM

## 2021-12-17 RX ADMIN — MORPHINE SULFATE 2 MG: 2 INJECTION, SOLUTION INTRAMUSCULAR; INTRAVENOUS at 12:21

## 2021-12-17 RX ADMIN — SODIUM CHLORIDE: 9 INJECTION, SOLUTION INTRAVENOUS at 01:05

## 2021-12-17 RX ADMIN — IOPAMIDOL 80 ML: 755 INJECTION, SOLUTION INTRAVENOUS at 18:07

## 2021-12-17 RX ADMIN — PANTOPRAZOLE SODIUM 40 MG: 40 INJECTION, POWDER, FOR SOLUTION INTRAVENOUS at 12:21

## 2021-12-17 RX ADMIN — PROMETHAZINE HYDROCHLORIDE 25 MG: 25 INJECTION INTRAMUSCULAR; INTRAVENOUS at 02:11

## 2021-12-17 RX ADMIN — GADOTERIDOL 18 ML: 279.3 INJECTION, SOLUTION INTRAVENOUS at 10:01

## 2021-12-17 RX ADMIN — DIPHENHYDRAMINE HYDROCHLORIDE 25 MG: 25 TABLET ORAL at 02:12

## 2021-12-17 RX ADMIN — SODIUM CHLORIDE: 9 INJECTION, SOLUTION INTRAVENOUS at 21:09

## 2021-12-17 RX ADMIN — SODIUM CHLORIDE, PRESERVATIVE FREE 10 ML: 5 INJECTION INTRAVENOUS at 01:05

## 2021-12-17 RX ADMIN — ACETAMINOPHEN 650 MG: 325 TABLET ORAL at 21:13

## 2021-12-17 RX ADMIN — MORPHINE SULFATE 2 MG: 2 INJECTION, SOLUTION INTRAMUSCULAR; INTRAVENOUS at 08:18

## 2021-12-17 RX ADMIN — SODIUM CHLORIDE, PRESERVATIVE FREE 10 ML: 5 INJECTION INTRAVENOUS at 21:07

## 2021-12-17 RX ADMIN — MORPHINE SULFATE 2 MG: 2 INJECTION, SOLUTION INTRAMUSCULAR; INTRAVENOUS at 02:12

## 2021-12-17 RX ADMIN — ACETAMINOPHEN 650 MG: 325 TABLET ORAL at 01:00

## 2021-12-17 ASSESSMENT — PAIN DESCRIPTION - LOCATION
LOCATION: HEAD
LOCATION: HEAD

## 2021-12-17 ASSESSMENT — PAIN SCALES - GENERAL
PAINLEVEL_OUTOF10: 8
PAINLEVEL_OUTOF10: 7
PAINLEVEL_OUTOF10: 3

## 2021-12-17 NOTE — H&P
HISTORY AND PHYSICAL             Date: 12/17/2021        Patient Name: Anjana Alfaro     YOB: 1965      Age:  60 y.o. Chief Complaint   Headache    History Obtained From   patient, electronic medical record    History of Present Illness      25-year-old male has been having headache for last 2 weeks. He has been trying over-the-counter pain medication without much relief. Couple days back he presented to the emergency room with headache, nausea, emesis with the blood streak content. Patient reported chest pain however chest pain was more like associated with nausea and emesis. Currently denies any nausea, emesis. Complaining of headache, partially relieved with pain medications. Past Medical History   No past medical history on file. Past Surgical History   No past surgical history on file. Medications Prior to Admission     Not on any medications  Allergies   Patient has no known allergies. Social History     Social History     Tobacco History     Smoking Status  Former Smoker    Smokeless Tobacco Use  Never Used          Alcohol History     Alcohol Use Status  Not Currently          Drug Use     Drug Use Status  Not Currently          Sexual Activity     Sexually Active  Not Asked                Family History   Negative for cancer, diabetes mellitus,CAD     Review of Systems   Review of Systems     Positive for headache, nausea and emesis on presentation    Physical Exam   BP (!) 144/90   Pulse 81   Temp 97.6 °F (36.4 °C) (Oral)   Resp 16   SpO2 97%     Physical Exam  Constitutional:       Appearance: Normal appearance. He is ill-appearing. HENT:      Head: Normocephalic and atraumatic. Cardiovascular:      Rate and Rhythm: Normal rate and regular rhythm. Pulmonary:      Effort: Pulmonary effort is normal.      Breath sounds: Normal breath sounds. Abdominal:      General: Abdomen is flat. Palpations: Abdomen is soft.    Skin:     Capillary Refill: Capillary TECHNOLOGIST PROVIDED HISTORY: Reason for exam:->TWIs on EKG, HA Has a \"code stroke\" or \"stroke alert\" been called? ->No Decision Support Exception - unselect if not a suspected or confirmed emergency medical condition->Emergency Medical Condition (MA) Reason for Exam: TWIs on EKG, HA Additional signs and symptoms: no Relevant Medical/Surgical History: none FINDINGS: BRAIN/VENTRICLES: There is no acute intracranial hemorrhage, mass effect or midline shift. No abnormal extra-axial fluid collection. The gray-white differentiation is maintained without evidence of an acute infarct. There is no evidence of hydrocephalus. ORBITS: The visualized portion of the orbits demonstrate no acute abnormality. SINUSES: Destructive soft tissue mass centered in the sphenoid sinuses involving the upper clivus and likely the sella turcica. There is partial invasion of the left cavernous sinus. No mastoid effusion. SOFT TISSUES/SKULL:  No acute osseous or soft tissue abnormality. Destructive central skull base mass as above. 1. No acute intracranial abnormality. 2. Destructive central skull base mass centered in the sphenoid sinuses and clivus with suspected invasion of the sella turcica and left cavernous sinus. Recommend MRI with and without contrast using pituitary mass protocol to ensure adequate imaging coverage. CT ABDOMEN PELVIS W IV CONTRAST Additional Contrast? None    Result Date: 12/15/2021  EXAMINATION: CTA OF THE CHEST; CT OF THE ABDOMEN AND PELVIS WITH CONTRAST 12/15/2021 11:12 pm; 12/15/2021 11:13 pm: TECHNIQUE: CTA of the chest was performed after the administration of intravenous contrast.  Multiplanar reformatted images are provided for review. MIP images are provided for review.  Dose modulation, iterative reconstruction, and/or weight based adjustment of the mA/kV was utilized to reduce the radiation dose to as low as reasonably achievable.; CT of the abdomen and pelvis was performed with the administration of intravenous contrast. Multiplanar reformatted images are provided for review. Dose modulation, iterative reconstruction, and/or weight based adjustment of the mA/kV was utilized to reduce the radiation dose to as low as reasonably achievable. COMPARISON: None. HISTORY: ORDERING SYSTEM PROVIDED HISTORY: chest pain, vomiting blood TECHNOLOGIST PROVIDED HISTORY: Reason for exam:->chest pain, vomiting blood Decision Support Exception - unselect if not a suspected or confirmed emergency medical condition->Emergency Medical Condition (MA) Reason for Exam: chest pain, vomiting blood Additional signs and symptoms: no Relevant Medical/Surgical History: 80 ml isovue 370 used; ORDERING SYSTEM PROVIDED HISTORY: pain, vomiting blood TECHNOLOGIST PROVIDED HISTORY: Additional Contrast?->None Reason for exam:->pain, vomiting blood Decision Support Exception - unselect if not a suspected or confirmed emergency medical condition->Emergency Medical Condition (MA) Reason for Exam: pain, vomiting blood Additional signs and symptoms: no Relevant Medical/Surgical History: 80 ml isovue 370 used FINDINGS: CTA CHEST: Pulmonary arteries[de-identified] The main pulmonary artery is well opacified, the pulmonary arteries are well opacified for evaluation. No pulmonary emboli are identified. No significant pulmonary arterial enlargement is seen. Mediastinum: No thoracic aortic aneurysm is identified. Atherosclerotic calcifications are seen. Aortic valvular calcifications are identified as well as coronary artery atherosclerosis. No significant focal esophageal thickening is identified. Right hilar lymphadenopathy is identified. Lungs/Pleura: No pleural effusion is identified. There is a 4 mm ground-glass nodule identified in the right upper lobe, axial image 25 anteriorly.   Several areas of clustered nodules in a branching pattern are seen within the lungs bilaterally, with the most prominent noted in the right middle lobe, on and around axial image 71 with focal subpleural consolidation. This has a nodular like appearance measuring 2.2 x 1.8 cm in size, axial image 73. No spiculated lung mass is identified to suggest a definite primary lung neoplasm. Soft Tissues/Bones: Within the partially imaged lower aspect of the right neck, there is lymphadenopathy seen with a 4 cm short axis lymph nodes seen on around axial image 16. There is also partial imaging of a right neck mass on axial image 1. No acute fracture is identified within the chest.  No axillary lymphadenopathy is identified. No left neck base lymphadenopathy is identified. Degenerative changes are seen in the thoracic spine as well as the lower cervical spine without fracture. Mild degenerative changes. CT ABDOMEN: Organs: There is a bilobed appearing cyst identified within the left hepatic lobe, segment 2. There is a small probable cyst seen in the right hepatic lobe, axial image 47, too small to characterize. No enhancing mass. No splenic mass. No discrete right adrenal mass is identified. There is a left adrenal nodule measuring at least 12 mm in size, not well evaluated. The gallbladder appears grossly unremarkable. No hyperdense calculi are identified. No pancreatic mass is identified. No peripancreatic inflammatory process. No solid enhancing renal mass is identified. No hydroureteronephrosis is identified. GI/Bowel: The visualized loops of bowel appear normal in caliber. No ileus or obstruction is identified. No acute diverticulitis is identified. The appendix is noted in the left abdomen, with the cecum in the left upper quadrant. No obstruction is seen. This may be a normal variant in this patient. Focal irregular appearing submucosal edema seen within the gastric antrum with a small focus of gas seen on axial image 49 along the anterolateral aspect of the distal stomach suspicious for air within a peptic ulcer.  Peritoneum/Retroperitoneum: No abdominal aortic aneurysm is identified. Atherosclerotic disease is identified. No aortic stenosis is identified. No bulky retroperitoneal lymphadenopathy is identified. CT PELVIS: Other: No pelvic mass is identified. No free fluid is identified in the pelvis. No bulky pelvic lymphadenopathy is identified. Atherosclerotic disease. Bones/Soft Tissues: No acute subcutaneous soft tissue abnormality is identified. No acute osseous abnormality. Degenerative changes are seen in the sacroiliac joints and spine. No osseous destructive process is identified. The greatest degree of degenerative changes seen at the level of L3-L4, L4-L5 and L5-S1. There is focal irregular appearing submucosal edema in the region of the pyloric antrum, with mild adjacent haziness as well as a small focus of gas which may represent air within a peptic ulcer. This would be better assessed with endoscopy. There are several clustered nodule seen in a branching type pattern within the lungs bilaterally, most prominent in the right middle lobe. This right middle lobe nodular process has the focal largest nodule measuring 2.2 x 1.8 cm in size. Given the appearance and location, chronic infection and/or granulomatous process is favored, with associated right-sided hilar lymphadenopathy. A neoplastic process is felt less likely. Follow-up recommendations as below. Given overall findings, could consider pulmonology consultation regarding workup and management. Large areas of right neck lymphadenopathy, partially imaged. Recommend dedicated CT imaging of the neck with IV contrast for further evaluation. Lymphadenopathy in the neck would be amenable to percutaneous biopsy. RECOMMENDATIONS: Fleischner Society guidelines for follow-up and management of incidentally detected pulmonary nodules: Multiple Solid Nodules: Nodule size greater than 8 mm In a low-risk patient, CT at 3-6 months, then consider CT at 18-24 months.  In a high-risk patient, CT at 3-6 months, then CT at 18-24 months. - Low risk patients include individuals with minimal or absent history of smoking and other known risk factors. - High risk patients include individuals with a history or smoking or known risk factors. Radiology 2017 http://pubs. rsna.org/doi/full/10.1148/radiol. 5580935167     XR CHEST PORTABLE    Result Date: 12/15/2021  EXAMINATION: ONE XRAY VIEW OF THE CHEST 12/15/2021 6:03 pm COMPARISON: None. HISTORY: ORDERING SYSTEM PROVIDED HISTORY: chest pain, EKG changes TECHNOLOGIST PROVIDED HISTORY: Reason for exam:->chest pain, EKG changes Reason for Exam: chest pain   ekg changes   ha FINDINGS: The lungs are without acute focal process. There is no effusion or pneumothorax. The cardiomediastinal silhouette is without acute process. The osseous structures are without acute process. No acute process. CTA PULMONARY W CONTRAST    Result Date: 12/15/2021  EXAMINATION: CTA OF THE CHEST; CT OF THE ABDOMEN AND PELVIS WITH CONTRAST 12/15/2021 11:12 pm; 12/15/2021 11:13 pm: TECHNIQUE: CTA of the chest was performed after the administration of intravenous contrast.  Multiplanar reformatted images are provided for review. MIP images are provided for review. Dose modulation, iterative reconstruction, and/or weight based adjustment of the mA/kV was utilized to reduce the radiation dose to as low as reasonably achievable.; CT of the abdomen and pelvis was performed with the administration of intravenous contrast. Multiplanar reformatted images are provided for review. Dose modulation, iterative reconstruction, and/or weight based adjustment of the mA/kV was utilized to reduce the radiation dose to as low as reasonably achievable. COMPARISON: None.  HISTORY: ORDERING SYSTEM PROVIDED HISTORY: chest pain, vomiting blood TECHNOLOGIST PROVIDED HISTORY: Reason for exam:->chest pain, vomiting blood Decision Support Exception - unselect if not a suspected or confirmed emergency medical condition->Emergency Medical Condition (MA) Reason for Exam: chest pain, vomiting blood Additional signs and symptoms: no Relevant Medical/Surgical History: 80 ml isovue 370 used; ORDERING SYSTEM PROVIDED HISTORY: pain, vomiting blood TECHNOLOGIST PROVIDED HISTORY: Additional Contrast?->None Reason for exam:->pain, vomiting blood Decision Support Exception - unselect if not a suspected or confirmed emergency medical condition->Emergency Medical Condition (MA) Reason for Exam: pain, vomiting blood Additional signs and symptoms: no Relevant Medical/Surgical History: 80 ml isovue 370 used FINDINGS: CTA CHEST: Pulmonary arteries[de-identified] The main pulmonary artery is well opacified, the pulmonary arteries are well opacified for evaluation. No pulmonary emboli are identified. No significant pulmonary arterial enlargement is seen. Mediastinum: No thoracic aortic aneurysm is identified. Atherosclerotic calcifications are seen. Aortic valvular calcifications are identified as well as coronary artery atherosclerosis. No significant focal esophageal thickening is identified. Right hilar lymphadenopathy is identified. Lungs/Pleura: No pleural effusion is identified. There is a 4 mm ground-glass nodule identified in the right upper lobe, axial image 25 anteriorly. Several areas of clustered nodules in a branching pattern are seen within the lungs bilaterally, with the most prominent noted in the right middle lobe, on and around axial image 71 with focal subpleural consolidation. This has a nodular like appearance measuring 2.2 x 1.8 cm in size, axial image 73. No spiculated lung mass is identified to suggest a definite primary lung neoplasm. Soft Tissues/Bones: Within the partially imaged lower aspect of the right neck, there is lymphadenopathy seen with a 4 cm short axis lymph nodes seen on around axial image 16. There is also partial imaging of a right neck mass on axial image 1.   No acute fracture is identified within the chest.  No axillary lymphadenopathy is identified. No left neck base lymphadenopathy is identified. Degenerative changes are seen in the thoracic spine as well as the lower cervical spine without fracture. Mild degenerative changes. CT ABDOMEN: Organs: There is a bilobed appearing cyst identified within the left hepatic lobe, segment 2. There is a small probable cyst seen in the right hepatic lobe, axial image 47, too small to characterize. No enhancing mass. No splenic mass. No discrete right adrenal mass is identified. There is a left adrenal nodule measuring at least 12 mm in size, not well evaluated. The gallbladder appears grossly unremarkable. No hyperdense calculi are identified. No pancreatic mass is identified. No peripancreatic inflammatory process. No solid enhancing renal mass is identified. No hydroureteronephrosis is identified. GI/Bowel: The visualized loops of bowel appear normal in caliber. No ileus or obstruction is identified. No acute diverticulitis is identified. The appendix is noted in the left abdomen, with the cecum in the left upper quadrant. No obstruction is seen. This may be a normal variant in this patient. Focal irregular appearing submucosal edema seen within the gastric antrum with a small focus of gas seen on axial image 49 along the anterolateral aspect of the distal stomach suspicious for air within a peptic ulcer. Peritoneum/Retroperitoneum: No abdominal aortic aneurysm is identified. Atherosclerotic disease is identified. No aortic stenosis is identified. No bulky retroperitoneal lymphadenopathy is identified. CT PELVIS: Other: No pelvic mass is identified. No free fluid is identified in the pelvis. No bulky pelvic lymphadenopathy is identified. Atherosclerotic disease. Bones/Soft Tissues: No acute subcutaneous soft tissue abnormality is identified. No acute osseous abnormality.   Degenerative changes are seen in the sacroiliac joints and spine.  No osseous destructive process is identified. The greatest degree of degenerative changes seen at the level of L3-L4, L4-L5 and L5-S1. There is focal irregular appearing submucosal edema in the region of the pyloric antrum, with mild adjacent haziness as well as a small focus of gas which may represent air within a peptic ulcer. This would be better assessed with endoscopy. There are several clustered nodule seen in a branching type pattern within the lungs bilaterally, most prominent in the right middle lobe. This right middle lobe nodular process has the focal largest nodule measuring 2.2 x 1.8 cm in size. Given the appearance and location, chronic infection and/or granulomatous process is favored, with associated right-sided hilar lymphadenopathy. A neoplastic process is felt less likely. Follow-up recommendations as below. Given overall findings, could consider pulmonology consultation regarding workup and management. Large areas of right neck lymphadenopathy, partially imaged. Recommend dedicated CT imaging of the neck with IV contrast for further evaluation. Lymphadenopathy in the neck would be amenable to percutaneous biopsy. RECOMMENDATIONS: Fleischner Society guidelines for follow-up and management of incidentally detected pulmonary nodules: Multiple Solid Nodules: Nodule size greater than 8 mm In a low-risk patient, CT at 3-6 months, then consider CT at 18-24 months. In a high-risk patient, CT at 3-6 months, then CT at 18-24 months. - Low risk patients include individuals with minimal or absent history of smoking and other known risk factors. - High risk patients include individuals with a history or smoking or known risk factors. Radiology 2017 http://pubs. rsna.org/doi/full/10.1148/radiol. 8715638858     MRI BRAIN W WO CONTRAST    Result Date: 12/17/2021  EXAM: MRI BRAIN W WO CONTRAST INDICATION: Skull base mass and sinuses COMPARISON: No comparison images are available for review TECHNIQUE: Multiplanar, multisequence MR imaging of the head obtained without and with contrast. IV contrast: ProHance 18 mL FINDINGS: There is a isointense to mildly hyperintense T2, isointense T1 enhancing mass with ill-defined margins centered at the sphenoid sinuses and clivus with bone destruction. The mass extends anteriorly to the posterior aspect of nasal cavity and posteriorly breaching the cortex of clivus at the prepontine cistern. There is minimal mass effect on the dura at this level with CSF signal intact anterior to the basilar artery. The mass abuts and mildly compresses the undersurface of the pituitary gland compatible with bone destruction of the sella floor. On postcontrast sagittal T1 series 13, image 8, the mass measures are 3.7 cm AP and 3.2 cm craniocaudal. The mass measures 2.7 cm transverse on postcontrast coronal series 12, image 4. There is invasion of left cavernous sinus with partial encasement of left carotid artery. There is possible invasion of right cavernous sinus level of the horizontal segment of internal carotid artery cavernous segment. There is no suprasellar extension. Major intracranial vessels are grossly patent. Ventricles are normal. Minimal cerebral white matter hyperintense FLAIR signal is present. There is no diffusion restriction. No intracranial hemorrhage. Enhancing neoplasm with bone destruction 3.7 x 3.2 x 2.7 cm centered at the sphenoid sinus and clivus with left cavernous sinus invasion and possible right cavernous sinus invasion extending anterior posterior from posterior aspect of nasal cavity to the  prepontine cistern causing destruction of floor of sella and mild mass effect on the pituitary gland. 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 Solitary lesion  makes bone metastasis less likely.  The intermediate T2 characteristics are not typical for chordoma or chondrosarcoma. Assessment      Hospital Problems           Last Modified POA    Pituitary mass [facility]) 12/16/2021 Yes          Plan     Intracranial mass  Mass appears to be sinus base rather than pituitary  Neurosurgery consulted  ENT consultation  Oncology consultation  Symptomatic relief of headache      Emesis, hematemesis?   More likely from blood-streaked vomitus rather than hematemesis  Continue with PPI    Disposition: Pending plan from surgical team  Consultations Ordered:  IP CONSULT TO GI  IP CONSULT TO OTOLARYNGOLOGY  IP CONSULT TO NEUROSURGERY  IP CONSULT TO ONCOLOGY    Electronically signed by Traci Kraus MD on 12/17/21 at 12:49 PM EST

## 2021-12-17 NOTE — CARE COORDINATION
Case Management Assessment           Initial Evaluation                Date / Time of Evaluation: 12/17/2021 11:33 AM                 Assessment Completed by: Nikita Navas RN     Patient is from home and independent prior. He was travelling for work and lives in [place]. He was staying in a hotel here. He does not have insurance coverage. If able to fly home he would be able to have someone pick him up from the airport but if not able to drive or fly he is not sure how he would return to home. 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 Current PCP: No primary care provider on file. Clinic Patient: No    Chart Reviewed: Yes  Patient/ Family Interviewed: Yes    Initial assessment completed at bedside with: patient    Hospitalization in the last 30 days: No    Emergency Contacts:  Extended Emergency Contact Information  Primary Emergency Contact: Kartik Hoover  Phone: [phone]  Relation: Child    Advance Directives:   Code Status: Full [address]: No  Agent: NA  Contact Number: NA    Financial  Payor: /     Elyssa Shelbyle required for SNF: No    Pharmacy    [facility] [address] [place] [address] [phone] [facility] [phone]  [address] [place] [id number]  Phone: [phone] Fax: [phone]      Potential assistance Purchasing Medications: Potential Assistance Purchasing Medications: Yes  Does Patient want to participate in local refill/ meds to beds program?:      Meds To Beds General Rules:  1. Can ONLY be done Monday- Friday between 8:30am-5pm  2. Prescription(s) must be in pharmacy by 3pm to be filled same day  3. Copy of patient's insurance/ prescription drug card and patient face sheet must be sent along with the prescription(s)  4. Cost of Rx cannot be added to hospital bill. If financial assistance is needed, please contact unit  or ;  or  CANNOT provide pharmacy voucher for patients co-pays  5. Patients can then  the prescription on their way out of the hospital at discharge, or pharmacy can deliver to the bedside if staff is available. (payment due at time of pick-up or delivery - cash, check, or card accepted)     Able to afford home medications/ co-pay costs: No-unsure at this time    ADLS  Support Systems: Children    PT AM-PAC:   /24  OT AM-PAC:   /24    [place]: house  Steps: unsure    Plans to RETURN to current housing: Yes  Barriers to RETURNING to current housing: unsure-travel may be an issue      DISCHARGE PLAN:  Disposition: TBD    Transportation PLAN for discharge: TBD     Factors facilitating achievement of predicted outcomes: Family support, Cooperative and Pleasant    Barriers to discharge: Pain and neurosurgery consulted    Additional Case Management Notes: NA    The Plan for Transition of Care is related to the following treatment goals of Pituitary mass ([facility] [E23.6]    The Patient and/or patient representative David and his family were provided with a choice of provider and agrees with the discharge plan Not Indicated    Freedom of choice list was provided with basic dialogue that supports the patient's individualized plan of care/goals and shares the quality data associated with the providers.  Not Indicated    Care Transition patient: No    Sanchez Basurto RN  [facility]  Case Management Department  Ph: [phone]   Fax: [phone]

## 2021-12-17 NOTE — CONSULTS
Amos 78  [id number]   1965   12/17/2021    Requesting physician: Amado Marcial MD    Reason for consultation: sphenoid mass    History of present illness: Patient is a 60 y.o. male w/ 20 pack year smoking history, no other PMH on file who presented on to OSH on 12/15/21 for persistent headache, abdominal pain and hematemesis. Patient is a , lives in [place], and was in the region driving a truck load. He report worsening headache over the last 2 weeks, frontal/pressure in nature, rates it a 9/10. He denies changes in vision, weakness, falls, difficulty with speech. Patient was taking \"a lot\" of NSAIDs to try to relieve the pain and they did not help. He then admits to binge drinking to try to alleviate the pain. He reports worsening abdominal pain and an episode of hematemesis about 4 days ago. 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 ROS:   GENERAL:  Denies fever or recent illness. Denies weight changes   EYES:  Denies vision change or diplopia  EARS:  Denies hearing loss  CARDIAC:  Denies chest pain  RESPIRATORY:  Denies shortness of breath  SKIN:  Denies rash or lesions   HEM:  Denies excessive bruising  PSYCH:  Denies anxiety or depression  NEURO: + headache   :  Denies urinary difficulty  GI: + hematemesis   MUSCULOSKELETAL:  No arthralgias    No Known Allergies    No past medical history on file. No past surgical history on file. Social History     Occupational History    Not on file   Tobacco Use    Smoking status: Former Smoker    Smokeless tobacco: Never Used   Substance and Sexual Activity    Alcohol use: Not Currently    Drug use: Not Currently    Sexual activity: Not on file        No family history on file.      No outpatient medications have been marked as taking for the 12/16/21 encounter [facility] Encounter). Current Facility-Administered Medications   Medication Dose Route Frequency Provider Last Rate Last Admin    morphine (PF) injection 2 mg  2 mg IntraVENous Q4H PRN Norbert Fowler MD   2 mg at 12/17/21 0818    diphenhydrAMINE (BENADRYL) tablet 25 mg  25 mg Oral Q4H PRN Norbert Fowler MD   25 mg at 12/17/21 0212    promethazine (PHENERGAN) injection 25 mg  25 mg IntraVENous Q4H PRN Norbert Fowler MD   25 mg at 12/17/21 0211    sodium chloride flush 0.9 % injection 10 mL  10 mL IntraVENous 2 times per day Norbert Fowler MD   10 mL at 12/17/21 0105    sodium chloride flush 0.9 % injection 10 mL  10 mL IntraVENous PRN Norbert Fowler MD        0.9 % sodium chloride infusion  25 mL IntraVENous PRDAX Fowler MD        ondansetron [facility]) injection 4 mg  4 mg IntraVENous Q4H PRN Norbert Fowler MD        polyethylene glycol [facility]) packet 17 g  17 g Oral Daily PRN Norbert Fowler MD        acetaminophen (TYLENOL) tablet 650 mg  650 mg Oral Q4H PRN Norbert Fowler MD   650 mg at 12/17/21 0100    Or    acetaminophen (TYLENOL) suppository 650 mg  650 mg Rectal Q4H PRN Norbert Fowler MD        0.9 % sodium chloride infusion   IntraVENous Continuous Norbert Fowler MD 75 mL/hr at 12/17/21 0105 New Bag at 12/17/21 0105      Objective:  /75   Pulse 53   Temp 98 °F (36.7 °C) (Oral)   Resp 16   SpO2 96%     Physical Exam:  Patient seen and examined   General: Well developed. Alert and cooperative in no acute distress. HENT: atraumatic, neck supple. Poor dentition. Eyes: Optic discs: Not tested  Pulmonary: unlabored respiratory effort  Cardiovascular:  Warm well perfused.  No peripheral edema  Gastrointestinal: abdomen soft, NT, ND    Neurologic Exam:  Neurological:  Mental Status: Awake, alert, oriented x 4   Attention: Intact  Language: No aphasia or dysarthria noted  Sensation: Intact to all extremities to light touch  Coordination: Intact    Cranial Nerves:  Cranial Nerves:  II: Visual acuity not tested, denies new visual changes / diplopia  III, IV, VI: PERRL, 3 mm bilaterally, EOMI, no nystagmus noted  V: Facial sensation intact bilaterally to touch  VII: Face symmetric  VIII: Hearing intact bilaterally to spoken voice  IX: Palate movement equal bilaterally  XI: Shoulder shrug equal bilaterally  XII: Tongue midline    Musculoskeletal:   Gait: Not tested   Assist devices: None   Tone: normal   Motor strength:    Right  Left    Right  Left    Deltoid  5 5  Hip Flex  5 5   Biceps  5 5  Knee Extensors  5 5   Triceps  5 5  Knee Flexors  5 5   Wrist Ext  5 5  Ankle Dorsiflex. 5 5   Wrist Flex  5 5  Ankle Plantarflex. 5 5   Handgrip  5 5  Ext Glenn Longus  5 5   Thumb Ext  5 5           Radiological Findings:  CT head wo contrast  12/15/2021 2016  Impression 1. No acute intracranial abnormality. 2. Destructive central skull base mass centered in the sphenoid sinuses and clivus with suspected invasion of the sella turcica and left cavernous sinus. Recommend MRI with and without contrast using pituitary mass protocol to ensure adequate imaging coverage. CT abdomen pelvis w contrast  12/15/2021  Impression There is focal irregular appearing submucosal edema in the region of the pyloric antrum, with mild adjacent haziness as well as a small focus of gas which may represent air within a peptic ulcer. This would be better assessed with endoscopy. There are several clustered nodule seen in a branching type pattern within the lungs bilaterally, most prominent in the right middle lobe. This right middle lobe nodular process has the focal largest nodule measuring 2.2 x 1.8 cm in size. Given the appearance and location, chronic infection and/or granulomatous process is favored, with associated right-sided hilar lymphadenopathy. A neoplastic process is felt less likely.   Follow-up recommendations as below. Given overall findings, could consider pulmonology consultation regarding workup and management. Large areas of right neck lymphadenopathy, partially imaged. Recommend dedicated CT imaging of the neck with IV contrast for further evaluation. Lymphadenopathy in the neck would be amenable to percutaneous biopsy. 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 Further imaging pending. Plan:  1. No emergent neurosurgical intervention indicated, however will update any surgical plan of care once imaging/lab work up is completed  2. q 4 hour neuro checks  3. MRI brain w wo contrast- pituitary protocol  4. CT sinuses, CTA head wo contrast  5. All pituitary labs sent  6. ENT following, appreciate assistance with management   7. GI consulted for abdominal pain/hematemesis   8. Diet/activity per primary team  9. SCDs for DVT prophylaxis  10. Thank you for consult, will follow. Please call with questions/change in neurologic exam             DISPO- remain inpatient from [address] [place] [address] standpoint, work up pending   MARIA ALEJANDRA Chahal-CNP  Neurosurgery Nurse Practitioner  [phone]    Patient was seen and examined with Dr. Chivo Cummings who agrees with above assessment and plan. Electronically signed by:  MARIA ALEJANDRA Rios NP, 12/17/2021 9:45 AM

## 2021-12-17 NOTE — CONSULTS
[facility] [address] [facility] [place]  GI Progress Note      Patient: Nancy Arroyo  : 1965       Date:  2021    Subjective:       History of Present Illness  Patient is a 60 y.o.  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 Patient states he had an episode of bloody emesis 2 nights ago. He noticed several blood clots in his vomit. He endorsed some epigastric pain at that time. He states he was self-medicating with alcohol and ibuprofen for his progressively worsening headache in the past 2 weeks. He reports drinking about 4-5 drinks a day and \"a lot\" of ibuprofen for the pain. He denies any nausea or vomiting since. His epigastric pain has since resolved. No changes in his bowel habits or appetite. He denies any family history of colon or stomach cancer. He has 20-pack-year history and quit when he was diagnosed with COVID. He denies any chest pain, shortness of breath, fever, chills, unintentional weight loss, diarrhea, constipation, muscle weakness or cough. No past medical history on file. No past surgical history on file. Past Endoscopic History: None    Admission Meds  No current facility-administered medications on file prior to encounter. No current outpatient medications on file prior to encounter. Patient has history of taking NSAIDs. Allergies  No Known Allergies   Social   Social History     Tobacco Use    Smoking status: Former Smoker    Smokeless tobacco: Never Used   Substance Use Topics    Alcohol use: Not Currently        No family history on file. No family history of colon cancer, Crohn's disease, or ulcerative colitis. Review of Systems  Pertinent items are noted in HPI.        Physical Exam    /75   Pulse 53   Temp 98 °F (36.7 °C) (Oral)   Resp 16   SpO2 96%   General appearance: alert, cooperative, no distress, appears stated age  Anicteric, No Jaundice  Head: Normocephalic, without obvious abnormality  Lungs: clear to auscultation bilaterally, Normal Effort  Heart: regular rate and rhythm, normal S1 and S2, no murmurs or rubs  Abdomen: soft, non-tender; bowel sounds normal; no masses,  no organomegaly  Extremities: atraumatic, no cyanosis or edema  Skin: warm and dry  Neuro: intact  AAOX3      Data Review:    Recent Labs     12/15/21  1752 12/17/21  0656   WBC 12.8* 11.8*   HGB 14.8 14.3   HCT 46.8 43.2   MCV 81.8 80.1   * 451*     Recent Labs     12/15/21  1752 12/17/21  0657   * 134*   K 4.4 4.3   CL 99 97*   CO2 24 24   BUN 12 16   CREATININE 1.1 0.8*     Recent Labs     12/15/21  1752   AST 22   ALT 14   BILITOT 0.2   ALKPHOS 72     Recent Labs     12/15/21  1752   LIPASE 44     Recent Labs     12/15/21  1752   PROTIME 11.8   INR 0.92     No results for input(s): PTT in the last 72 hours. No results for input(s): OCCULTBLD in the last 72 hours. Imaging Studies:                             CT-scan of abdomen and pelvis:  Organs: There is a bilobed appearing cyst identified within the left hepatic   lobe, segment 2. Rheta Natalie is a small probable cyst seen in the right hepatic   lobe, axial image 47, too small to characterize.  No enhancing mass.  No   splenic mass.  No discrete right adrenal mass is identified. Rheta Natalie is a left   adrenal nodule measuring at least 12 mm in size, not well evaluated.  The   gallbladder appears grossly unremarkable.  No hyperdense calculi are   identified.  No pancreatic mass is identified.  No peripancreatic   inflammatory process.  No solid enhancing renal mass is identified.  No   hydroureteronephrosis is identified.       GI/Bowel:  The visualized loops of bowel appear normal in caliber.  No ileus   or obstruction is identified.  No acute diverticulitis is identified.  The   appendix is noted in the left abdomen, with the cecum in the left upper   quadrant.  No obstruction is seen.  This may be a normal variant in this   patient.  Focal irregular appearing submucosal edema seen within the gastric   antrum with a small focus of gas seen on axial image 49 along the   anterolateral aspect of the distal stomach suspicious for air within a peptic   ulcer.       Peritoneum/Retroperitoneum: No abdominal aortic aneurysm is identified. Atherosclerotic disease is identified.  No aortic stenosis is identified.  No   bulky retroperitoneal lymphadenopathy is identified. Assessment:     Active Problems:    Pituitary mass [facility])  Resolved Problems:    * No resolved hospital problems. *    Acute gastritis   - Patient has recent history of NSAID and alcohol binging  - Hgb remains stable  - CT imaging revealed possible peptic ulcer in distal stomach     Recommendations:     PPI daily  Would benefit from endoscopy, outpatient likely    Thank you for the opportunity to participate in David Shafer's care.       Ofelia Rodriguez, PGY2  12/17/2021  10:21 AM

## 2021-12-17 NOTE — CONSULTS
[place]      Patient Name: [address] Record Number:  [id number]  Primary Care Physician:  No primary care provider on file. Date of Consultation: 12/17/2021      Cc: skull base mass    History of Present Illness:    Location: sphenoid sinus and sella with cavernous extension  Quality: David is a(n) 60 y.o. male who presents with a 4 day history of headaches, cough and hematemesis. Imaging in hospital of head showed mass in central skull base in the sphenoid sinus, cavernous sinus, sella and clivus. Symptoms include as above  The patient is currently in fair  medical condition  The tumor was discovered yesterday on imaging in hospital day(s) ago by radiology  The patient has received no treatment prior to today's visit  Nasal Discharge:  none  Nasal Obstruction: no   Post Nasal Drainage:  no  Nasal Bleeding:  no  Sense of Smell:  no  HIstory of Facial/Head Trauma: No  Pain/Discomfort: yes, headache  Onset: 4 days  Eye Symptoms:   denies    Patient Active Problem List   Diagnosis    Pituitary mass ([facility] [address]     No past surgical history on file. No family history on file.   Social History     Socioeconomic History    Marital status: Single     Spouse name: Not on file    Number of children: Not on file    Years of education: Not on file    Highest education level: Not on file   Occupational History    Not on file   Tobacco Use    Smoking status: Former Smoker    Smokeless tobacco: Never Used   Substance and Sexual Activity    Alcohol use: Not Currently    Drug use: Not Currently    Sexual activity: Not on file   Other Topics Concern    Not on file   Social History Narrative    Not on file     Social Determinants of Health     Financial Resource Strain:     Difficulty of Paying Living Expenses: Not on file   Food Insecurity:     Worried About [address] in the Last Year: Not on file    Ran Out of Food in the Last Year: Not on file Transportation Needs:     Lack of Transportation (Medical): Not on file    Lack of Transportation (Non-Medical): Not on file   Physical Activity:     Days of Exercise per Week: Not on file    Minutes of Exercise per Session: Not on file   Stress:     Feeling of Stress : Not on file   Social Connections:     Frequency of Communication with Friends and Family: Not on file    Frequency of Social Gatherings with Friends and Family: Not on file    Attends Latter day Services: Not on file    Active Member of Clubs or Organizations: Not on file    Attends Club or Organization Meetings: Not on file    Marital Status: Not on file   Intimate Partner Violence:     Fear of Current or Ex-Partner: Not on file    Emotionally Abused: Not on file    Physically Abused: Not on file    Sexually Abused: Not on file   Housing Stability:     Unable to Pay for Housing in the Last Year: Not on file    Number of [place] in the Last Year: Not on file    Unstable Housing in the Last Year: Not on file       DRUG/FOOD ALLERGIES: Patient has no known allergies.     CURRENT MEDICATIONS  Prior to Admission medications    Not on File       Lab Studies:  Lab Results   Component Value Date    WBC 12.8 (H) 12/15/2021    HGB 14.8 12/15/2021    HCT 46.8 12/15/2021    MCV 81.8 12/15/2021     (H) 12/15/2021     Lab Results   Component Value Date    GLUCOSE 116 (H) 12/15/2021    BUN 12 12/15/2021    CREATININE 1.1 12/15/2021    K 4.4 12/15/2021     (L) 12/15/2021    CL 99 12/15/2021    CALCIUM 9.3 12/15/2021     No results found for: MG  No results found for: PHOS  Lab Results   Component Value Date    ALKPHOS 72 12/15/2021    ALT 14 12/15/2021    AST 22 12/15/2021    BILITOT 0.2 12/15/2021    PROT 7.5 12/15/2021             REVIEW OF SYSTEMS  The following systems were reviewed and revealed the following in addition to any already discussed in the HPI:    CONSTITUTIONAL: No weight loss, no fever, no night sweats, no chills  EYES: no vision changes, no blurry vision  EARS: no changes in hearing, no otalgia  NOSE: no epistaxis, no rhinorrhea  RESPIRATORY: No Difficulty breathing, no shortness of breath  CV: no chest pain, No Peripheral vascular disease  HEME: No coagulation disorder, No Bleeding disorder  NEURO: no TIA or stroke-like symptoms  SKIN: No new rashes in the head and neck, no recent skin cancers  MOUTH: No new ulcers, no recent teeth infections  GASTROINTESTINAL: No diarrhea, stomach pain  PSYCH: No anxiety, no depression      PHYSICAL EXAM  /75   Pulse 53   Temp 98 °F (36.7 °C) (Oral)   Resp 16   SpO2 96%     GENERAL: No Acute Distress, Alert and Oriented, No Hoarseness  EYES: EOMI, Anti-icteric  NOSE: No epistaxis, nasal mucosa within normal limits, no purulent drainage  EARS: Normal external canal appearance, EAC patent bilaterally  FACE: 1/6 House-Brackmann Scale, symmetric, sensation equal bilaterally  ORAL CAVITY: No masses or lesions palpated, uvula is midline, moist mucous membranes. NECK: Normal range of motion, no thyromegaly, trachea is midline, No  lymphadenopathy, No neck masses, no crepitus  CHEST: Normal respiratory effort, no retractions, breathing comfortably  SKIN: No rashes, normal appearing skin, no evidence of skin lesions/tumors    RADIOLOGY  Summary of findings:  Impression   1. No acute intracranial abnormality. 2. 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 Recommend MRI with and without contrast using pituitary mass protocol to   ensure adequate imaging coverage. PROCEDURE  None           ASSESSMENT/PLAN  David is a very pleasant 60 y.o. male with a mass in the sphenoid sinus with clival erosion and invasion of the sella and cavernous sinus. 1. Await MRI  2. Will discuss with NS with plan to follow.    3. Recommend CT head with image guidance protocol with and without contrast.    4. More recs to follow           I have performed a head and neck physical exam personally or was physically present during the key or critical portions of the service. Medical Decision Making:   The following items were considered in medical decision making:  Independent review of images  Review / order clinical lab tests  Review / order radiology tests  Decision to obtain old records  Review and summation of old records as accessed through [facility] (a summary of my findings in these old records: NA)

## 2021-12-18 ENCOUNTER — APPOINTMENT (OUTPATIENT)
Dept: CT IMAGING | Age: 56
DRG: 143 | End: 2021-12-18
Attending: INTERNAL MEDICINE

## 2021-12-18 LAB
ANION GAP SERPL CALCULATED.3IONS-SCNC: 11 MMOL/L (ref 3–16)
BASOPHILS ABSOLUTE: 0 K/UL (ref 0–0.2)
BASOPHILS RELATIVE PERCENT: 0.4 %
BUN BLDV-MCNC: 20 MG/DL (ref 7–20)
CALCIUM SERPL-MCNC: 9.1 MG/DL (ref 8.3–10.6)
CHLORIDE BLD-SCNC: 97 MMOL/L (ref 99–110)
CO2: 24 MMOL/L (ref 21–32)
CORTISOL TOTAL: 19.8 UG/DL
CREAT SERPL-MCNC: 0.8 MG/DL (ref 0.9–1.3)
EOSINOPHILS ABSOLUTE: 0 K/UL (ref 0–0.6)
EOSINOPHILS RELATIVE PERCENT: 0.1 %
GFR AFRICAN AMERICAN: >60
GFR NON-AFRICAN AMERICAN: >60
GLUCOSE BLD-MCNC: 109 MG/DL (ref 70–99)
HCT VFR BLD CALC: 41.6 % (ref 40.5–52.5)
HEMOGLOBIN: 13.8 G/DL (ref 13.5–17.5)
LYMPHOCYTES ABSOLUTE: 0.9 K/UL (ref 1–5.1)
LYMPHOCYTES RELATIVE PERCENT: 9.7 %
MCH RBC QN AUTO: 26.6 PG (ref 26–34)
MCHC RBC AUTO-ENTMCNC: 33.1 G/DL (ref 31–36)
MCV RBC AUTO: 80.3 FL (ref 80–100)
MONOCYTES ABSOLUTE: 0.9 K/UL (ref 0–1.3)
MONOCYTES RELATIVE PERCENT: 9.3 %
NEUTROPHILS ABSOLUTE: 7.8 K/UL (ref 1.7–7.7)
NEUTROPHILS RELATIVE PERCENT: 80.5 %
PDW BLD-RTO: 17.9 % (ref 12.4–15.4)
PLATELET # BLD: 419 K/UL (ref 135–450)
PMV BLD AUTO: 7.8 FL (ref 5–10.5)
POTASSIUM REFLEX MAGNESIUM: 4.3 MMOL/L (ref 3.5–5.1)
RBC # BLD: 5.18 M/UL (ref 4.2–5.9)
SODIUM BLD-SCNC: 132 MMOL/L (ref 136–145)
WBC # BLD: 9.7 K/UL (ref 4–11)

## 2021-12-18 PROCEDURE — 2060000000 HC ICU INTERMEDIATE R&B

## 2021-12-18 PROCEDURE — 2580000003 HC RX 258: Performed by: INTERNAL MEDICINE

## 2021-12-18 PROCEDURE — 82533 TOTAL CORTISOL: CPT

## 2021-12-18 PROCEDURE — 70491 CT SOFT TISSUE NECK W/DYE: CPT

## 2021-12-18 PROCEDURE — C9113 INJ PANTOPRAZOLE SODIUM, VIA: HCPCS | Performed by: STUDENT IN AN ORGANIZED HEALTH CARE EDUCATION/TRAINING PROGRAM

## 2021-12-18 PROCEDURE — 6370000000 HC RX 637 (ALT 250 FOR IP): Performed by: INTERNAL MEDICINE

## 2021-12-18 PROCEDURE — 85025 COMPLETE CBC W/AUTO DIFF WBC: CPT

## 2021-12-18 PROCEDURE — 6360000004 HC RX CONTRAST MEDICATION: Performed by: INTERNAL MEDICINE

## 2021-12-18 PROCEDURE — 80048 BASIC METABOLIC PNL TOTAL CA: CPT

## 2021-12-18 PROCEDURE — 36415 COLL VENOUS BLD VENIPUNCTURE: CPT

## 2021-12-18 PROCEDURE — 6360000002 HC RX W HCPCS: Performed by: STUDENT IN AN ORGANIZED HEALTH CARE EDUCATION/TRAINING PROGRAM

## 2021-12-18 RX ORDER — MECOBALAMIN 5000 MCG
5 TABLET,DISINTEGRATING ORAL NIGHTLY
Status: DISCONTINUED | OUTPATIENT
Start: 2021-12-18 | End: 2021-12-22 | Stop reason: HOSPADM

## 2021-12-18 RX ADMIN — Medication 5 MG: at 03:35

## 2021-12-18 RX ADMIN — IOPAMIDOL 80 ML: 755 INJECTION, SOLUTION INTRAVENOUS at 20:01

## 2021-12-18 RX ADMIN — ACETAMINOPHEN 650 MG: 325 TABLET ORAL at 08:56

## 2021-12-18 RX ADMIN — DIPHENHYDRAMINE HYDROCHLORIDE 25 MG: 25 TABLET ORAL at 00:00

## 2021-12-18 RX ADMIN — PANTOPRAZOLE SODIUM 40 MG: 40 INJECTION, POWDER, FOR SOLUTION INTRAVENOUS at 08:57

## 2021-12-18 RX ADMIN — SODIUM CHLORIDE, PRESERVATIVE FREE 10 ML: 5 INJECTION INTRAVENOUS at 08:57

## 2021-12-18 ASSESSMENT — PAIN SCALES - GENERAL
PAINLEVEL_OUTOF10: 7
PAINLEVEL_OUTOF10: 3

## 2021-12-18 NOTE — PROGRESS NOTES
Neurosurgery Progress Note    Patient seen and examined on 12/18/21. No acute events overnight. Reports improvement in headaches. Neurologically intact on exam. MRI with large mass in sphenoid sinus with invasion of cavernous sinus on left and bony destruction. CT chest/abd/pelvis with numerous nodules/lymphadenopathy of unclear etiology. A/P: 59 yo smoker with headache. Imaging with large enhancing mass in sphenoid/ethmoid sinuses with cavernous sinus and sellar extension.    -Neuro stable  -Frequent neuro checks  -HOB elevated  -Pain control with PO meds  -Pituitary labs pending  -ENT following  -GI following for stomach ulcer. On PPI. Will hold on steroids for now. -Pending labs, will likely plan for transnasal biopsy later this week. Will discuss with ENT.    -OK for diet from neurosurgical perspective  -DVT ppx  -Will follow    Antwan Varela MD, PhD  [address] [place] [address], [address] [place], [place], [address] [phone] (c), [phone] (o)

## 2021-12-18 NOTE — PROGRESS NOTES
[facility]  Gastroenterology Progress Note  David Echeverria is a 60 y.o. male patient. No diagnosis found. SUBJECTIVE:    No abdominal pain, GI bleeding, nausea or vomiting    Physical    VITALS:  BP (!) 146/88   Pulse 61   Temp 98.3 °F (36.8 °C) (Oral)   Resp 16   SpO2 95%   TEMPERATURE:  Current - Temp: 98.3 °F (36.8 °C); Max - Temp  Av °F (36.7 °C)  Min: 97.6 °F (36.4 °C)  Max: 98.3 °F (36.8 °C)    Abdomen soft, ND, NT, no HSM, Bowel sounds normal     Data      Recent Labs     12/15/21  1752 21  0656 21  0649   WBC 12.8* 11.8* 9.7   HGB 14.8 14.3 13.8   HCT 46.8 43.2 41.6   MCV 81.8 80.1 80.3   * 451* 419     Recent Labs     12/15/21  1752 21  0657 21  0649   * 134* 132*   K 4.4 4.3 4.3   CL 99 97* 97*   CO2 24 24 24   BUN 12 16 20   CREATININE 1.1 0.8* 0.8*     Recent Labs     12/15/21  1752   AST 22   ALT 14   BILITOT 0.2   ALKPHOS 72     Recent Labs     12/15/21  1752   LIPASE 44               Impression:   1. Hematemesis x1  --3 days ago--none since then  2. Abnormal imaging of the GI tract with questionable ulcer in the gastric antrum  3. NSAID use  4. Normal hemoglobin at 14     Plan:  Patient has not had any further bleeding and his hemoglobin is normal.  Likely NSAID induced gastric ulcer. Continue PPI twice daily. Will sign off.   Thanks.        Karie Pablo MD  [employer]

## 2021-12-18 NOTE — CONSULTS
Amos 78  [id number]   1965   12/17/2021    Requesting physician: Kvng Robles MD    Reason for consultation: sphenoid mass    History of present illness: Patient is a 60 y.o. male w/ 20 pack year smoking history, no other PMH on file who presented on to OSH on 12/15/21 for persistent headache, abdominal pain and hematemesis. Patient is a , lives in [place], and was in the region driving a truck load. He report worsening headache over the last 2 weeks, frontal/pressure in nature, rates it a 9/10. He denies changes in vision, weakness, falls, difficulty with speech. Patient was taking \"a lot\" of NSAIDs to try to relieve the pain and they did not help. He then admits to binge drinking to try to alleviate the pain. He reports worsening abdominal pain and an episode of hematemesis about 4 days ago. 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 ROS:   GENERAL:  Denies fever or recent illness. Denies weight changes   EYES:  Denies vision change or diplopia  EARS:  Denies hearing loss  CARDIAC:  Denies chest pain  RESPIRATORY:  Denies shortness of breath  SKIN:  Denies rash or lesions   HEM:  Denies excessive bruising  PSYCH:  Denies anxiety or depression  NEURO: + headache   :  Denies urinary difficulty  GI: + hematemesis   MUSCULOSKELETAL:  No arthralgias    No Known Allergies    No past medical history on file. No past surgical history on file. Social History     Occupational History    Not on file   Tobacco Use    Smoking status: Former Smoker    Smokeless tobacco: Never Used   Substance and Sexual Activity    Alcohol use: Not Currently    Drug use: Not Currently    Sexual activity: Not on file        No family history on file.      No outpatient medications have been marked as taking for the 12/16/21 encounter [facility] Encounter). Current Facility-Administered Medications   Medication Dose Route Frequency Provider Last Rate Last Admin    morphine (PF) injection 2 mg  2 mg IntraVENous Q4H PRN Ahsan Adamson MD   2 mg at 12/17/21 1221    diphenhydrAMINE (BENADRYL) tablet 25 mg  25 mg Oral Q4H PRN Ahsan Adamson MD   25 mg at 12/17/21 0212    promethazine (PHENERGAN) injection 25 mg  25 mg IntraVENous Q4H PRN Ahsan Adamson MD   25 mg at 12/17/21 0211    pantoprazole (PROTONIX) injection 40 mg  40 mg IntraVENous Daily Ariane Gamboa MD   40 mg at 12/17/21 1221    sodium chloride flush 0.9 % injection 10 mL  10 mL IntraVENous 2 times per day Ahsan Adamson MD   10 mL at 12/17/21 2107    sodium chloride flush 0.9 % injection 10 mL  10 mL IntraVENous PRN Ahsan Adamson MD        0.9 % sodium chloride infusion  25 mL IntraVENous PRN Ahsan Adamson MD        [facility] injection 4 mg  4 mg IntraVENous Q4H PRN Ahsan Adamson MD        polyethylene glycol [facility]) packet 17 g  17 g Oral Daily PRN Ahsan Adamson MD        acetaminophen (TYLENOL) tablet 650 mg  650 mg Oral Q4H PRN Ahsan Adamson MD   650 mg at 12/17/21 2113    Or    acetaminophen (TYLENOL) suppository 650 mg  650 mg Rectal Q4H PRN Ahsan Adamson MD        0.9 % sodium chloride infusion   IntraVENous Continuous Ahsan Adamson MD 75 mL/hr at 12/17/21 2109 New Bag at 12/17/21 2109      Objective:  BP (!) 143/76   Pulse 73   Temp 98.2 °F (36.8 °C) (Oral)   Resp 16   SpO2 95%     Physical Exam:  Patient seen and examined   General: Well developed. Alert and cooperative in no acute distress. HENT: atraumatic, neck supple. Poor dentition. Eyes: Optic discs: Not tested  Pulmonary: unlabored respiratory effort  Cardiovascular:  Warm well perfused.  No peripheral edema  Gastrointestinal: abdomen soft, NT, ND    Neurologic Exam:  Neurological:  Mental Status: Awake, alert, oriented x 4   Attention: Intact  Language: No aphasia or dysarthria noted  Sensation: Intact to all extremities to light touch  Coordination: Intact    Cranial Nerves:  Cranial Nerves:  II: Visual acuity not tested, denies new visual changes / diplopia  III, IV, VI: PERRL, 3 mm bilaterally, EOMI, no nystagmus noted  V: Facial sensation intact bilaterally to touch  VII: Face symmetric  VIII: Hearing intact bilaterally to spoken voice  IX: Palate movement equal bilaterally  XI: Shoulder shrug equal bilaterally  XII: Tongue midline    Musculoskeletal:   Gait: Not tested   Assist devices: None   Tone: normal   Motor strength:    Right  Left    Right  Left    Deltoid  5 5  Hip Flex  5 5   Biceps  5 5  Knee Extensors  5 5   Triceps  5 5  Knee Flexors  5 5   Wrist Ext  5 5  Ankle Dorsiflex. 5 5   Wrist Flex  5 5  Ankle Plantarflex. 5 5   Handgrip  5 5  Ext Glenn Longus  5 5   Thumb Ext  5 5           Radiological Findings:    I personally reviewed the patient's imaging which consists of a CT head and CT C/A/P dated 12/15/2021. The CT reveals a destructive skull base lesion within the sphenoid and extending to the clivus. The lesion may extend into the left cavernous sinus. CT C/A/P with diffuse lymphadenopathy of unclear significance. Labs  Recent Labs     12/15/21  1752 12/17/21  0657   * 134*   CL 99 97*   CO2 24 24   BUN 12 16   CREATININE 1.1 0.8*   GLUCOSE 116* 99     Recent Labs     12/15/21  1752 12/17/21  0656   WBC 12.8* 11.8*   RBC 5.72 5.39   INR 0.92  --        Patient Active Problem List    Diagnosis Date Noted    [facility]       Assessment:  59 yo admitted with headache, chest pain, and abdominal pain. 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 Further imaging pending. Plan:  1.  No emergent neurosurgical intervention indicated, however will update any surgical plan of care once imaging/lab work up is completed  2. q 4 hour neuro checks  3. MRI brain w wo contrast- pituitary protocol  4. CT sinuses, CTA head wo contrast  5. All pituitary labs sent  6. ENT following, appreciate assistance with management   7. GI consulted for abdominal pain/hematemesis   8. Diet/activity per primary team  9. SCDs for DVT prophylaxis  10. Thank you for consult, will follow.  Please call with questions/change in neurologic exam           Stephanie Ramirez MD, PhD  [address], [address] [place] [address], [id number] [phone] (c), [phone] (o)

## 2021-12-18 NOTE — CONSULTS
HEMATOLOGY/ONCOLOGY CONSULTATION:     12/18/2021 4:51 PM    REASON FOR CONSULT: rule out malignancy    PROVIDERS:  No primary care provider on file. CHIEF COMPLAINT:     Headache    HISTORY OF PRESENT ILLNESS:     Mr. Martin Ontiveros is a 60 yr old male  who was having headache, tried to take Excedrin, then also started to drink alcohol to help his headache. He then developed more chest and abdominal pain and was seen at [facility]. He then also had some hematemesis as well. Exam and labs were unremarkable but CT head showed mass destroying his clivus centered in sphenoid sinus. He also had CTA, abdomen pelvis which showed 2.2 cm conglomerate density right lung mass, right neck adenopathy, irregularity in gastric antrum suspicious for ulcer. He has been transferred her to Neurosurgery service, with Dr. Cecile Smalls evaluating him, recommended CT head to further evaluate mass in sphenoid sinus. He has been seen by Dr. 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 He also has noted difficulty, and seeing double when looking to left, as well as a droopy left eyelid. He also said he has had a growth on right side of his neck for a few months now, does not affect his swallowing or chewing. He lives in [place], and was working in [employer]. PAST MEDICAL HISTORY:     No past medical history on file. PAST SURGICAL HISTORY:      No past surgical history on file.     SOCIAL HISTORY:     Social History     Socioeconomic History    Marital status: Single     Spouse name: Not on file    Number of children: Not on file    Years of education: Not on file    Highest education level: Not on file   Occupational History    Not on file   Tobacco Use    Smoking status: Former Smoker    Smokeless tobacco: Never Used   Substance and Sexual Activity    Alcohol use: Not Currently    Drug use: Not Currently    Sexual activity: Not on file   Other Topics Concern    Not on file Social History Narrative    Not on file     Social Determinants of Health     Financial Resource Strain:     Difficulty of Paying Living Expenses: Not on file   Food Insecurity:     Worried About Running Out of Food in the Last Year: Not on file    Moiz of Food in the Last Year: Not on file   Transportation Needs:     Lack of Transportation (Medical): Not on file    Lack of Transportation (Non-Medical): Not on file   Physical Activity:     Days of Exercise per Week: Not on file    Minutes of Exercise per Session: Not on file   Stress:     Feeling of Stress : Not on file   Social Connections:     Frequency of Communication with Friends and Family: Not on file    Frequency of Social Gatherings with Friends and Family: Not on file    Attends Yazdanism Services: Not on file    Active Member of [address] [place] [address] [employer] or Organizations: Not on file    Attends Club or Organization Meetings: Not on file    Marital Status: Not on file   Intimate Partner Violence:     Fear of Current or Ex-Partner: Not on file    Emotionally Abused: Not on file    Physically Abused: Not on file    Sexually Abused: Not on file   Housing Stability:     Unable to Pay for Housing in the Last Year: Not on file    Number of [place] in the Last Year: Not on file    Unstable Housing in the Last Year: Not on file       FAMILY HISTORY:     No family history on file.     ALLERGIES:     Allergies as of 12/16/2021    (No Known Allergies)       MEDICATIONS:     Current Facility-Administered Medications   Medication Dose Route Frequency Provider Last Rate Last Admin    melatonin disintegrating tablet 5 mg  5 mg Oral Nightly Ruma Jesus MD   5 mg at 12/18/21 0335    morphine (PF) injection 2 mg  2 mg IntraVENous Q4H PRN Ruma Jesus MD   2 mg at 12/17/21 1221    diphenhydrAMINE (BENADRYL) tablet 25 mg  25 mg Oral Q4H PRN Ruma Jesus MD   25 mg at 12/18/21 0000    promethazine (PHENERGAN) injection 25 mg  25 mg IntraVENous Q4H PRN Esme Zuñiga MD   25 mg at 12/17/21 0211    pantoprazole (PROTONIX) injection 40 mg  40 mg IntraVENous Daily Beatris Nix MD   40 mg at 12/18/21 0857    sodium chloride flush 0.9 % injection 10 mL  10 mL IntraVENous 2 times per day Esme Zuñiga MD   10 mL at 12/18/21 0857    sodium chloride flush 0.9 % injection 10 mL  10 mL IntraVENous PRN Esme Zuñiga MD        0.9 % sodium chloride infusion  25 mL IntraVENous PRN Esme Zuñiga MD        ondansetron [facility]) injection 4 mg  4 mg IntraVENous Q4H PRN Esme Zuñiga MD        polyethylene glycol [facility]) packet 17 g  17 g Oral Daily PRN Esme Zuñiga MD        acetaminophen (TYLENOL) tablet 650 mg  650 mg Oral Q4H PRN Esme Zuñiga MD   650 mg at 12/18/21 3594    Or    acetaminophen (TYLENOL) suppository 650 mg  650 mg Rectal Q4H PRN Esme Zuñiga MD        0.9 % sodium chloride infusion   IntraVENous Continuous Esme Zuñiga MD 75 mL/hr at 12/17/21 2109 New Bag at 12/17/21 2109     No current facility-administered medications on file prior to encounter. No current outpatient medications on file prior to encounter. REVIEW OF SYSTEMS:      · Constitutional: Denies fever, sweats, weight loss  · Eyes:See above. No scleral icterus. · ENT: See above  · Cardiovascular: See above  · Respiratory: No cough or wheezing, no sputum production. No hemoptysis. .  · Gastrointestinal: See above  · Genitourinary: No dysuria, trouble voiding, or hematuria. · Musculoskeletal: . No joint complaints. · Integumentary: No rash or pruritis. · Neurological: See above  · Endocrine: No temperature intolerance. No excessive thirst, fluid intake, or urination. · Hematologic/Lymphatic: See above  · Allergic/Immunologic: No nasal congestion or hives. ·     PHYSICAL EXAM:       /84   Pulse 73   Temp 98.4 °F (36.9 °C) (Oral)   Resp 16   SpO2 95%     General appearance: Alert and cooperative.   Appears older than stated age  [de-identified]: Normocephalic. Left eyelid droop, unable to move left eye laterally. No scleral icterus. Normal mucus membranes. Normal mouth exam  Neck: Large conglomerate very firm irregular matted right cervical adenopathy, about 6 x 7 cm in diameter  Lymph Nodes: No palpable lymph nodes in the left cervical supraclavicular, axillary areas. Lungs: Clear to auscultation. No wheezes, rales or rhonchi are heard. Heart: Regular rate and rhythm, S1, S2 normal  Abdomen: Soft, non-tender; bowel sounds normal; no masses, distention, ascites. Liver and spleen are not palpable. Extremities: without cyanosis, clubbing, edema or asymmetry  Skin: No jaundice, purpura or petechiae  Neuro:  Alert and oriented. Speech is normal.  .  Mentation is normal.  Patient is able to carry a complex conversation regarding their illness.   Musculoskeletal: No tenderness or swelling over any bones or joints noted  Psychiatric: depressed affect      LABS:     Lab Results   Component Value Date    WBC 9.7 12/18/2021    HGB 13.8 12/18/2021    HCT 41.6 12/18/2021    MCV 80.3 12/18/2021     12/18/2021       Lab Results   Component Value Date    GLUCOSE 109 (H) 12/18/2021    BUN 20 12/18/2021    CREATININE 0.8 (L) 12/18/2021    K 4.3 12/18/2021       Lab Results   Component Value Date    ALKPHOS 72 12/15/2021    AST 22 12/15/2021         IMAGING:     CTA HEAD W WO CONTRAST [[id number]] Collected: 12/18/21 0814      Order Status: Completed Updated: 12/18/21 0840     Narrative:       PROCEDURE: CTA HEAD W WO CONTRAST, CT SINUS FOR IMAGE GUIDANCE     INDICATION: Enhancing neoplasm with bone destruction centered at the sphenoid sinus and clivus with left cavernous sinus invasion, preoperative 0.5 mm axial cuts include top of head and whole nose     COMPARISON: none     TECHNIQUE: Axial CT imaging obtained through the head prior to and following administration of IV contrast. Axial images, multiplanar reformatted images, and 3D post-processing, maximum intensity projection images were reviewed for CT angiographic   technique.   Individualized dose optimization technique was used in order to meet ALARA standards for radiation dose reduction.  In addition to vendor specific dose reduction algorithms, the dose reduction techniques vary based on the specific scanner   utilized but frequently include automated exposure control, adjustment of the mA and/or kV according to patient size, and use of iterative reconstruction technique. IV contrast: 80 mL Isovue-370. FINDINGS:     ANTERIOR CIRCULATION: The intracranial internal carotid arteries, anterior cerebral arteries, and middle cerebral arteries demonstrate no occlusion or stenosis. No evidence for aneurysm or arteriovenous malformation. Left anterior cerebral artery slightly displaced by the mass centered within the clivus     Left cavernous carotid artery displaced laterally but does not appear to be effaced by the clivus mass       POSTERIOR CIRCULATION: The bilateral vertebral arteries, basilar artery and posterior cerebral arteries demonstrate no occlusion or stenosis. No evidence for aneurysm or arteriovenous malformation. Left vertebral artery dominant. INTRACRANIAL VENOUS SYSTEM: No evidence for intracranial venous thrombosis. No sign of any mastoid air cell fluid     INTRACRANIAL HEMORRHAGE: None. VENTRICLES: Normal in size and configuration for age. BRAIN PARENCHYMA: Gray-white matter differentiation is normal. No intracranial mass effect. SKULL: As previously described on MRI expansile mass, heterogeneous neoplasm involving the clivus and extending into the left cavernous sinus and posterior aspect of the nasal cavity, floor the sella turcica. Mass measures at least 4.1 cm in AP   dimension, width of 3.0 cm x 3.2 and fills the sphenoid sinus indicative of destructive osseous process lytic bone destruction. . The mass cannot be  on the CT from the pituitary, limited localization     PARANASAL SINUSES / MASTOIDS: Partial opacification left ethmoid air cells and posterior nasal sinus mass contiguous expansile mass in the clivus and skull base. Frontal sinuses are normally aerated. ORBITS: Normal.     EXTRACRANIAL SOFT TISSUES: Normal.      Impression:         1. Normal-appearing intracranial vasculature with no encasement of the middle cerebral or anterior cerebral arterial structures with slight displacement mainly of the left cavernous carotid by the sinus and clivus mass in the skull base       2. Unilateral left ethmoid air cell mucosal thickening, expansion of the sphenoid sinus and clivus with a soft tissue mass measuring up to 4.1 x 2.9 x 3.2 cm. The mass extends anteriorly to the posterior aspect of the nasal cavity posteriorly breaching   the cortex of the clivus at the prepontine cistern without signs of any dural based extension. The posterior margin the clivus is breached. The mass destroys portions of the clivus and there is erosion of the floor of the pituitary fossa and erosion of   the posterior nasal sinus walls and bilateral cavernous sinus walls.  Differential considerations would include sinonasal adenocarcinoma, undifferentiated carcinoma or lymphoma or less likely metastasis     CT SINUS FOR IMAGE GUIDANCE [[id number]] Collected: 12/18/21 0814     Order Status: Completed Updated: 12/18/21 0840     Narrative:       PROCEDURE: CTA HEAD W WO CONTRAST, CT SINUS FOR IMAGE GUIDANCE     INDICATION: Enhancing neoplasm with bone destruction centered at the sphenoid sinus and clivus with left cavernous sinus invasion, preoperative 0.5 mm axial cuts include top of head and whole nose     COMPARISON: none     TECHNIQUE: Axial CT imaging obtained through the head prior to and following administration of IV contrast. Axial images, multiplanar reformatted images, and 3D post-processing, maximum intensity projection images were reviewed for CT angiographic technique.   Individualized dose optimization technique was used in order to meet ALARA standards for radiation dose reduction.  In addition to vendor specific dose reduction algorithms, the dose reduction techniques vary based on the specific scanner   utilized but frequently include automated exposure control, adjustment of the mA and/or kV according to patient size, and use of iterative reconstruction technique. IV contrast: 80 mL Isovue-370. FINDINGS:     ANTERIOR CIRCULATION: The intracranial internal carotid arteries, anterior cerebral arteries, and middle cerebral arteries demonstrate no occlusion or stenosis. No evidence for aneurysm or arteriovenous malformation. Left anterior cerebral artery slightly displaced by the mass centered within the clivus     Left cavernous carotid artery displaced laterally but does not appear to be effaced by the clivus mass       POSTERIOR CIRCULATION: The bilateral vertebral arteries, basilar artery and posterior cerebral arteries demonstrate no occlusion or stenosis. No evidence for aneurysm or arteriovenous malformation. Left vertebral artery dominant. INTRACRANIAL VENOUS SYSTEM: No evidence for intracranial venous thrombosis. No sign of any mastoid air cell fluid     INTRACRANIAL HEMORRHAGE: None. VENTRICLES: Normal in size and configuration for age. BRAIN PARENCHYMA: Gray-white matter differentiation is normal. No intracranial mass effect. SKULL: As previously described on MRI expansile mass, heterogeneous neoplasm involving the clivus and extending into the left cavernous sinus and posterior aspect of the nasal cavity, floor the sella turcica. Mass measures at least 4.1 cm in AP   dimension, width of 3.0 cm x 3.2 and fills the sphenoid sinus indicative of destructive osseous process lytic bone destruction. . The mass cannot be  on the CT from the pituitary, limited localization     PARANASAL SINUSES / MASTOIDS: Partial opacification left ethmoid air cells and posterior nasal sinus mass contiguous expansile mass in the clivus and skull base. Frontal sinuses are normally aerated. ORBITS: Normal.     EXTRACRANIAL SOFT TISSUES: Normal.      Impression:         1. Normal-appearing intracranial vasculature with no encasement of the middle cerebral or anterior cerebral arterial structures with slight displacement mainly of the left cavernous carotid by the sinus and clivus mass in the skull base       2. Unilateral left ethmoid air cell mucosal thickening, expansion of the sphenoid sinus and clivus with a soft tissue mass measuring up to 4.1 x 2.9 x 3.2 cm. The mass extends anteriorly to the posterior aspect of the nasal cavity posteriorly breaching   the cortex of the clivus at the prepontine cistern without signs of any dural based extension. The posterior margin the clivus is breached. The mass destroys portions of the clivus and there is erosion of the floor of the pituitary fossa and erosion of   the posterior nasal sinus walls and bilateral cavernous sinus walls. Differential considerations would include sinonasal adenocarcinoma, undifferentiated carcinoma or lymphoma or less likely metastasis     MRI BRAIN W WO CONTRAST [[id number]] Collected: 12/17/21 1025     Order Status: Completed Updated: 12/17/21 1101     Narrative:       EXAM: MRI BRAIN W WO CONTRAST     INDICATION: Skull base mass and sinuses     COMPARISON: No comparison images are available for review     TECHNIQUE: Multiplanar, multisequence MR imaging of the head obtained without and with contrast.   IV contrast: ProHance 18 mL     FINDINGS:     There is a isointense to mildly hyperintense T2, isointense T1 enhancing mass with ill-defined margins centered at the sphenoid sinuses and clivus with bone destruction. The mass extends anteriorly to the posterior aspect of nasal cavity and posteriorly   breaching the cortex of clivus at the prepontine cistern.  There is minimal mass effect on the dura at this level with CSF signal intact anterior to the basilar artery. The mass abuts and mildly compresses the undersurface of the pituitary gland   compatible with bone destruction of the sella floor. On postcontrast sagittal T1 series 13, image 8, the mass measures are 3.7 cm AP and 3.2 cm craniocaudal. The mass measures 2.7 cm transverse on postcontrast coronal series 12, image 4. There is   invasion of left cavernous sinus with partial encasement of left carotid artery. There is possible invasion of right cavernous sinus level of the horizontal segment of internal carotid artery cavernous segment. There is no suprasellar extension. Major intracranial vessels are grossly patent. Ventricles are normal. Minimal cerebral white matter hyperintense FLAIR signal is present. There is no diffusion restriction. No intracranial hemorrhage.      Impression:         Enhancing neoplasm with bone destruction 3.7 x 3.2 x 2.7 cm centered at the sphenoid sinus and clivus with left cavernous sinus invasion and possible right cavernous sinus invasion extending anterior posterior from posterior aspect of nasal cavity to the    prepontine cistern causing destruction of floor of sella and mild mass effect on the pituitary gland. 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 Solitary lesion    makes bone metastasis less likely. The intermediate T2 characteristics are not typical for chordoma or chondrosarcoma.          CT A, abdomen pelvis 12/15/2021  FINDINGS:       CTA CHEST:       Pulmonary arteries[de-identified] The main pulmonary artery is well opacified, the   pulmonary arteries are well opacified for evaluation.  No pulmonary emboli   are identified.  No significant pulmonary arterial enlargement is seen.       Mediastinum: No thoracic aortic aneurysm is identified.  Atherosclerotic   calcifications are seen.  Aortic valvular calcifications are identified as   well as coronary artery atherosclerosis.  No significant focal esophageal   thickening is identified.  Right hilar lymphadenopathy is identified.       Lungs/Pleura: No pleural effusion is identified. Mentmore Bauer is a 4 mm   ground-glass nodule identified in the right upper lobe, axial image 25   anteriorly.  Several areas of clustered nodules in a branching pattern are   seen within the lungs bilaterally, with the most prominent noted in the right   middle lobe, on and around axial image 71 with focal subpleural   consolidation.  This has a nodular like appearance measuring 2.2 x 1.8 cm in   size, axial image 73.  No spiculated lung mass is identified to suggest a   definite primary lung neoplasm.       Soft Tissues/Bones: Within the partially imaged lower aspect of the right   neck, there is lymphadenopathy seen with a 4 cm short axis lymph nodes seen   on around axial image 16.  There is also partial imaging of a right neck mass   on axial image 1.  No acute fracture is identified within the chest.  No   axillary lymphadenopathy is identified.  No left neck base lymphadenopathy is   identified.  Degenerative changes are seen in the thoracic spine as well as   the lower cervical spine without fracture.  Mild degenerative changes.           CT ABDOMEN:       Organs:  There is a bilobed appearing cyst identified within the left hepatic   lobe, segment 2. Mentmore Bauer is a small probable cyst seen in the right hepatic   lobe, axial image 47, too small to characterize.  No enhancing mass.  No   splenic mass.  No discrete right adrenal mass is identified. Mentmore Bauer is a left   adrenal nodule measuring at least 12 mm in size, not well evaluated.  The   gallbladder appears grossly unremarkable.  No hyperdense calculi are   identified.  No pancreatic mass is identified.  No peripancreatic   inflammatory process.  No solid enhancing renal mass is identified.  No   hydroureteronephrosis is identified.       GI/Bowel: The visualized loops of bowel appear normal in caliber.  No ileus   or obstruction is identified.  No acute diverticulitis is identified.  The   appendix is noted in the left abdomen, with the cecum in the left upper   quadrant.  No obstruction is seen.  This may be a normal variant in this   patient.  Focal irregular appearing submucosal edema seen within the gastric   antrum with a small focus of gas seen on axial image 49 along the   anterolateral aspect of the distal stomach suspicious for air within a peptic   ulcer.       Peritoneum/Retroperitoneum: No abdominal aortic aneurysm is identified.    Atherosclerotic disease is identified.  No aortic stenosis is identified.  No   bulky retroperitoneal lymphadenopathy is identified.           CT PELVIS:       Other: No pelvic mass is identified.  No free fluid is identified in the   pelvis.  No bulky pelvic lymphadenopathy is identified.  Atherosclerotic   disease.       Bones/Soft Tissues: No acute subcutaneous soft tissue abnormality is   identified.  No acute osseous abnormality.  Degenerative changes are seen in   the sacroiliac joints and spine.  No osseous destructive process is   identified.  The greatest degree of degenerative changes seen at the level of   L3-L4, L4-L5 and L5-S1.           Impression   There is focal irregular appearing submucosal edema in the region of the   pyloric antrum, with mild adjacent haziness as well as a small focus of gas   which may represent air within a peptic ulcer.  This would be better assessed   with endoscopy.       There are several clustered nodule seen in a branching type pattern within   the lungs bilaterally, most prominent in the right middle lobe.  This right   middle lobe nodular process has the focal largest nodule measuring 2.2 x 1.8   cm in size.  Given the appearance and location, chronic infection and/or   granulomatous process is favored, with associated right-sided hilar sinus. Recommend MRI with and without contrast using pituitary mass protocol to   ensure adequate imaging coverage. ASSESSMENT:     1.  Mass in sphenoid sinus eroding into clivus, is very suspicious for malignancy. 2.  Very abnormal right cervical adenopathy is highly suspicious for malignancy. This will be easier to biopsy and get pathologic diagnosis of cancer. This could be primary head and neck/sinus cancer, but could also be lymphoma. If this is large cell lymphoma, he will respond very quickly to treatment. 3.   Lung abnormality is indeterminate. 3.  Likely NSAID induced gastritis/ulcer            PLAN:     1. Need biopsy, Dr. Judi Stern is planning on surgery but right cervical lymph node biopsy might be easier, and there is more tissue to send off for molecular testing, christiano if this is lymphoma. 2.  Further recommendations will depend on pathology  3. If path shows this is diffuse large B cell lymphoma, I would strongly recommend starting treatment here [facility], and once he recovers from his first cycle, he could return to [place]. 4.  If this is primary sinus cancer, then he should return home to [place] and start treatment there.        Blair Hernandez MD

## 2021-12-18 NOTE — PLAN OF CARE
Problem: Falls - Risk of:  Goal: Will remain free from falls  Description: Will remain free from falls  Outcome: Ongoing  Note: Fall precautions are in place. Pt is in chair with alarm on. Using call light appropriately. Call light and belongings are within reach. Problem: Pain:  Goal: Pain level will decrease  Description: Pain level will decrease  Outcome: Ongoing  Note: Pt educated on 0-10 pain rating scale. Pain controlled with PRN oral pain medications. Will continue to monitor.

## 2021-12-18 NOTE — PLAN OF CARE
Problem: Falls - Risk of:  Goal: Will remain free from falls  Description: Will remain free from falls  12/18/2021 1736 by Maxim Emanuel RN  Outcome: Ongoing     Problem: Pain:  Goal: Pain level will decrease  Description: Pain level will decrease  12/18/2021 1736 by Maxim Emanuel RN  Outcome: Ongoing     Problem: Pain:  Goal: Control of acute pain  Description: Control of acute pain  Outcome: Ongoing

## 2021-12-19 LAB
ANION GAP SERPL CALCULATED.3IONS-SCNC: 9 MMOL/L (ref 3–16)
BASOPHILS ABSOLUTE: 0 K/UL (ref 0–0.2)
BASOPHILS RELATIVE PERCENT: 0.4 %
BUN BLDV-MCNC: 17 MG/DL (ref 7–20)
CALCIUM SERPL-MCNC: 9.2 MG/DL (ref 8.3–10.6)
CHLORIDE BLD-SCNC: 99 MMOL/L (ref 99–110)
CO2: 27 MMOL/L (ref 21–32)
CREAT SERPL-MCNC: 0.9 MG/DL (ref 0.9–1.3)
EOSINOPHILS ABSOLUTE: 0 K/UL (ref 0–0.6)
EOSINOPHILS RELATIVE PERCENT: 0.4 %
GFR AFRICAN AMERICAN: >60
GFR NON-AFRICAN AMERICAN: >60
GLUCOSE BLD-MCNC: 187 MG/DL (ref 70–99)
HCT VFR BLD CALC: 41.1 % (ref 40.5–52.5)
HEMOGLOBIN: 13.7 G/DL (ref 13.5–17.5)
LYMPHOCYTES ABSOLUTE: 1.2 K/UL (ref 1–5.1)
LYMPHOCYTES RELATIVE PERCENT: 11.2 %
MCH RBC QN AUTO: 26.6 PG (ref 26–34)
MCHC RBC AUTO-ENTMCNC: 33.3 G/DL (ref 31–36)
MCV RBC AUTO: 80.1 FL (ref 80–100)
MONOCYTES ABSOLUTE: 1 K/UL (ref 0–1.3)
MONOCYTES RELATIVE PERCENT: 9 %
NEUTROPHILS ABSOLUTE: 8.6 K/UL (ref 1.7–7.7)
NEUTROPHILS RELATIVE PERCENT: 79 %
PDW BLD-RTO: 18.2 % (ref 12.4–15.4)
PLATELET # BLD: 405 K/UL (ref 135–450)
PMV BLD AUTO: 7.8 FL (ref 5–10.5)
POTASSIUM REFLEX MAGNESIUM: 4.6 MMOL/L (ref 3.5–5.1)
RBC # BLD: 5.13 M/UL (ref 4.2–5.9)
SODIUM BLD-SCNC: 135 MMOL/L (ref 136–145)
WBC # BLD: 10.9 K/UL (ref 4–11)

## 2021-12-19 PROCEDURE — 6360000002 HC RX W HCPCS: Performed by: STUDENT IN AN ORGANIZED HEALTH CARE EDUCATION/TRAINING PROGRAM

## 2021-12-19 PROCEDURE — C9113 INJ PANTOPRAZOLE SODIUM, VIA: HCPCS | Performed by: STUDENT IN AN ORGANIZED HEALTH CARE EDUCATION/TRAINING PROGRAM

## 2021-12-19 PROCEDURE — 80048 BASIC METABOLIC PNL TOTAL CA: CPT

## 2021-12-19 PROCEDURE — 6370000000 HC RX 637 (ALT 250 FOR IP): Performed by: INTERNAL MEDICINE

## 2021-12-19 PROCEDURE — 36415 COLL VENOUS BLD VENIPUNCTURE: CPT

## 2021-12-19 PROCEDURE — 85025 COMPLETE CBC W/AUTO DIFF WBC: CPT

## 2021-12-19 PROCEDURE — 99233 SBSQ HOSP IP/OBS HIGH 50: CPT | Performed by: OTOLARYNGOLOGY

## 2021-12-19 PROCEDURE — 2060000000 HC ICU INTERMEDIATE R&B

## 2021-12-19 PROCEDURE — 2580000003 HC RX 258: Performed by: INTERNAL MEDICINE

## 2021-12-19 RX ORDER — OXYMETAZOLINE HYDROCHLORIDE 0.05 G/100ML
2 SPRAY NASAL 2 TIMES DAILY PRN
Status: DISPENSED | OUTPATIENT
Start: 2021-12-19 | End: 2021-12-22

## 2021-12-19 RX ADMIN — SODIUM CHLORIDE, PRESERVATIVE FREE 10 ML: 5 INJECTION INTRAVENOUS at 08:30

## 2021-12-19 RX ADMIN — ACETAMINOPHEN 650 MG: 325 TABLET ORAL at 00:00

## 2021-12-19 RX ADMIN — ACETAMINOPHEN 650 MG: 325 TABLET ORAL at 11:51

## 2021-12-19 RX ADMIN — Medication 5 MG: at 22:40

## 2021-12-19 RX ADMIN — PANTOPRAZOLE SODIUM 40 MG: 40 INJECTION, POWDER, FOR SOLUTION INTRAVENOUS at 08:30

## 2021-12-19 RX ADMIN — ACETAMINOPHEN 650 MG: 325 TABLET ORAL at 07:06

## 2021-12-19 RX ADMIN — ACETAMINOPHEN 650 MG: 325 TABLET ORAL at 22:46

## 2021-12-19 ASSESSMENT — PAIN DESCRIPTION - FREQUENCY
FREQUENCY: INTERMITTENT
FREQUENCY: CONTINUOUS
FREQUENCY: INTERMITTENT

## 2021-12-19 ASSESSMENT — PAIN DESCRIPTION - PROGRESSION
CLINICAL_PROGRESSION: NOT CHANGED
CLINICAL_PROGRESSION: NOT CHANGED
CLINICAL_PROGRESSION: GRADUALLY WORSENING

## 2021-12-19 ASSESSMENT — PAIN DESCRIPTION - ONSET
ONSET: ON-GOING
ONSET: GRADUAL
ONSET: ON-GOING

## 2021-12-19 ASSESSMENT — PAIN DESCRIPTION - PAIN TYPE
TYPE: ACUTE PAIN

## 2021-12-19 ASSESSMENT — PAIN DESCRIPTION - DESCRIPTORS
DESCRIPTORS: ACHING
DESCRIPTORS: HEADACHE
DESCRIPTORS: HEADACHE

## 2021-12-19 ASSESSMENT — PAIN - FUNCTIONAL ASSESSMENT
PAIN_FUNCTIONAL_ASSESSMENT: ACTIVITIES ARE NOT PREVENTED
PAIN_FUNCTIONAL_ASSESSMENT: ACTIVITIES ARE NOT PREVENTED

## 2021-12-19 ASSESSMENT — PAIN SCALES - GENERAL
PAINLEVEL_OUTOF10: 0
PAINLEVEL_OUTOF10: 3
PAINLEVEL_OUTOF10: 1
PAINLEVEL_OUTOF10: 3
PAINLEVEL_OUTOF10: 7
PAINLEVEL_OUTOF10: 4
PAINLEVEL_OUTOF10: 3

## 2021-12-19 ASSESSMENT — PAIN DESCRIPTION - ORIENTATION
ORIENTATION: LEFT
ORIENTATION: ANTERIOR
ORIENTATION: ANTERIOR

## 2021-12-19 ASSESSMENT — PAIN DESCRIPTION - LOCATION
LOCATION: HEAD

## 2021-12-19 NOTE — PROGRESS NOTES
Hospitalist Progress Note      PCP: No primary care provider on file. Date of Admission: 12/16/2021    Chief Complaint: Headache    Hospital Course:  22-year-old male has been having headache for last 2 weeks. Imaging revealed large mass in sphenoid sinus with invasion of cavernous sinus on left and bony destruction. Subjective: No acute events reported overnight. Patient feels about the same as yesterday. Denies any new complaints    Medications:  Reviewed    Infusion Medications    sodium chloride      sodium chloride 75 mL/hr at 12/17/21 2109     Scheduled Medications    [START ON 12/20/2021] ceFAZolin  2,000 mg IntraVENous On Call to OR    melatonin  5 mg Oral Nightly    pantoprazole  40 mg IntraVENous Daily    sodium chloride flush  10 mL IntraVENous 2 times per day     PRN Meds: oxymetazoline, morphine, diphenhydrAMINE, promethazine, sodium chloride flush, sodium chloride, ondansetron, polyethylene glycol, acetaminophen **OR** acetaminophen      Intake/Output Summary (Last 24 hours) at 12/19/2021 1254  Last data filed at 12/19/2021 0851  Gross per 24 hour   Intake 240 ml   Output --   Net 240 ml       Physical Exam Performed:    BP (!) 143/91   Pulse 63   Temp 98.2 °F (36.8 °C) (Oral)   Resp 18   SpO2 96%     General appearance: No apparent distress, appears stated age and cooperative. HEENT: Drooping of left eyelid, EOMI, PERRLA  Neck: Supple, right-sided firm neck mass about 6 cm in diameter  Respiratory:  Normal respiratory effort. Clear to auscultation, bilaterally without Rales/Wheezes/Rhonchi. Cardiovascular: Regular rate and rhythm with normal S1/S2 without murmurs, rubs or gallops. Abdomen: Soft, non-tender, non-distended with normal bowel sounds. Musculoskeletal: No clubbing, cyanosis or edema bilaterally. Full range of motion without deformity. Skin: Skin color, texture, turgor normal.  No rashes or lesions.   Neurologic:  Neurovascularly intact without any focal sensory/motor deficits. Cranial nerves: II-XII intact, grossly non-focal.  Psychiatric: Alert and oriented, thought content appropriate, normal insight  Capillary Refill: Brisk,3 seconds, normal   Peripheral Pulses: +2 palpable, equal bilaterally       Labs:   Recent Labs     12/17/21  0656 12/18/21  0649 12/19/21  0827   WBC 11.8* 9.7 10.9   HGB 14.3 13.8 13.7   HCT 43.2 41.6 41.1   * 419 405     Recent Labs     12/17/21  0657 12/18/21  0649 12/19/21  0827   * 132* 135*   K 4.3 4.3 4.6   CL 97* 97* 99   CO2 24 24 27   BUN 16 20 17   CREATININE 0.8* 0.8* 0.9   CALCIUM 9.4 9.1 9.2     No results for input(s): AST, ALT, BILIDIR, BILITOT, ALKPHOS in the last 72 hours. No results for input(s): INR in the last 72 hours. No results for input(s): Kathia Dross in the last 72 hours. Urinalysis:    No results found for: Allegra Stevie, BACTERIA, RBCUA, BLOODU, [address], [place] [address]    Radiology:  CT SOFT TISSUE NECK W CONTRAST   Final Result      Extensive necrotic in hypervascular enhancing masses or lymphadenopathy in the right neck extending all the way from the  space to the supraclavicular region with extensive enhancing solid mass or lymphadenopathy displacing vascular structures    and displacing sternocleidomastoid muscle and possibly involving some of the sternocleidomastoid muscle, as described above. This could be head neck cancer or metastatic disease      Lytic bony destruction affecting the clivus and skull base with soft tissue mass in the posterior nasopharynx and clivus which may be metastatic disease      CTA HEAD W WO CONTRAST   Final Result      1. Normal-appearing intracranial vasculature with no encasement of the middle cerebral or anterior cerebral arterial structures with slight displacement mainly of the left cavernous carotid by the sinus and clivus mass in the skull base        2.  Unilateral left ethmoid air cell mucosal thickening, expansion of the sphenoid sinus and clivus with a soft tissue mass measuring up to 4.1 x 2.9 x 3.2 cm. The mass extends anteriorly to the posterior aspect of the nasal cavity posteriorly breaching    the cortex of the clivus at the prepontine cistern without signs of any dural based extension. The posterior margin the clivus is breached. The mass destroys portions of the clivus and there is erosion of the floor of the pituitary fossa and erosion of    the posterior nasal sinus walls and bilateral cavernous sinus walls. Differential considerations would include sinonasal adenocarcinoma, undifferentiated carcinoma or lymphoma or less likely metastasis      CT SINUS FOR IMAGE GUIDANCE   Final Result      1. Normal-appearing intracranial vasculature with no encasement of the middle cerebral or anterior cerebral arterial structures with slight displacement mainly of the left cavernous carotid by the sinus and clivus mass in the skull base        2. Unilateral left ethmoid air cell mucosal thickening, expansion of the sphenoid sinus and clivus with a soft tissue mass measuring up to 4.1 x 2.9 x 3.2 cm. The mass extends anteriorly to the posterior aspect of the nasal cavity posteriorly breaching    the cortex of the clivus at the prepontine cistern without signs of any dural based extension. The posterior margin the clivus is breached. The mass destroys portions of the clivus and there is erosion of the floor of the pituitary fossa and erosion of    the posterior nasal sinus walls and bilateral cavernous sinus walls.  Differential considerations would include sinonasal adenocarcinoma, undifferentiated carcinoma or lymphoma or less likely metastasis      MRI BRAIN W WO CONTRAST   Final Result      Enhancing neoplasm with bone destruction 3.7 x 3.2 x 2.7 cm centered at the sphenoid sinus and clivus with left cavernous sinus invasion and possible right cavernous sinus invasion extending anterior posterior from posterior aspect of nasal cavity to the prepontine cistern causing destruction of floor of sella and mild mass effect on the pituitary gland. 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 Solitary lesion    makes bone metastasis less likely. The intermediate T2 characteristics are not typical for chordoma or chondrosarcoma. 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  -MRI showing enhancing neoplasm with bone destruction 3.7 x 3.2 x 2.7 cm centered at the sphenoid sinus and clivus with left cavernous sinus invasion and possible right cavernous sinus invasion extending anterior posterior from posterior aspect of nasal cavity to the    prepontine cistern causing destruction of floor of sella and mild mass effect on the pituitary gland  -CT soft tissue of the neck showing Extensive necrotic in hypervascular enhancing masses or lymphadenopathy in the right neck extending all the way from the  space to the supraclavicular region with extensive enhancing solid mass or lymphadenopathy displacing vascular structures   and displacing sternocleidomastoid muscle and possibly involving some of the sternocleidomastoid muscle  -ENT, neurosurgery and oncology has been consulted  -Plan for possible transnasal biopsy tomorrow    Hematemesis  -GI following  -Continue PPI twice daily  -No plans for intervention at this time    Hyponatremia  -Continue to monitor    Diet: ADULT DIET;  Regular  Diet NPO  Code Status: Full Code    Dispo -possible biopsy tomorrow    Osiris Jasso MD

## 2021-12-19 NOTE — PLAN OF CARE
Problem: Pain:  Goal: Control of acute pain  Description: Control of acute pain  Outcome: Ongoing     Problem: Pain:  Goal: Pain level will decrease  Description: Pain level will decrease  Outcome: Ongoing     Problem: Falls - Risk of:  Goal: Will remain free from falls  Description: Will remain free from falls  Outcome: Ongoing

## 2021-12-19 NOTE — PROGRESS NOTES
Hospitalist Progress Note      PCP: No primary care provider on file. Date of Admission: 12/16/2021    Chief Complaint: Headache    Hospital Course:  58-year-old male has been having headache for last 2 weeks. Imaging revealed large mass in sphenoid sinus with invasion of cavernous sinus on left and bony destruction. Subjective: Patient continues to have headache. Denies any nausea, vomiting, lightheadedness, dizziness. Medications:  Reviewed    Infusion Medications    sodium chloride      sodium chloride 75 mL/hr at 12/17/21 2109     Scheduled Medications    melatonin  5 mg Oral Nightly    pantoprazole  40 mg IntraVENous Daily    sodium chloride flush  10 mL IntraVENous 2 times per day     PRN Meds: morphine, diphenhydrAMINE, promethazine, sodium chloride flush, sodium chloride, ondansetron, polyethylene glycol, acetaminophen **OR** acetaminophen    No intake or output data in the 24 hours ending 12/18/21 2159    Physical Exam Performed:    BP (!) 152/92   Pulse 68   Temp 98.1 °F (36.7 °C) (Oral)   Resp 16   SpO2 96%     General appearance: No apparent distress, appears stated age and cooperative. HEENT: Drooping of left eyelid, EOMI, PERRLA  Neck: Supple, right-sided firm neck mass about 6 cm in diameter  Respiratory:  Normal respiratory effort. Clear to auscultation, bilaterally without Rales/Wheezes/Rhonchi. Cardiovascular: Regular rate and rhythm with normal S1/S2 without murmurs, rubs or gallops. Abdomen: Soft, non-tender, non-distended with normal bowel sounds. Musculoskeletal: No clubbing, cyanosis or edema bilaterally. Full range of motion without deformity. Skin: Skin color, texture, turgor normal.  No rashes or lesions. Neurologic:  Neurovascularly intact without any focal sensory/motor deficits.  Cranial nerves: II-XII intact, grossly non-focal.  Psychiatric: Alert and oriented, thought content appropriate, normal insight  Capillary Refill: Brisk,3 seconds, normal Peripheral Pulses: +2 palpable, equal bilaterally       Labs:   Recent Labs     12/17/21  0656 12/18/21  0649   WBC 11.8* 9.7   HGB 14.3 13.8   HCT 43.2 41.6   * 419     Recent Labs     12/17/21  0657 12/18/21  0649   * 132*   K 4.3 4.3   CL 97* 97*   CO2 24 24   BUN 16 20   CREATININE 0.8* 0.8*   CALCIUM 9.4 9.1     No results for input(s): AST, ALT, BILIDIR, BILITOT, ALKPHOS in the last 72 hours. No results for input(s): INR in the last 72 hours. No results for input(s): Kathia Dross in the last 72 hours. Urinalysis:    No results found for: Allegra Stevie, BACTERIA, RBCUA, BLOODU, [address], [place] [address]    Radiology:  CTA HEAD W WO CONTRAST   Final Result      1. Normal-appearing intracranial vasculature with no encasement of the middle cerebral or anterior cerebral arterial structures with slight displacement mainly of the left cavernous carotid by the sinus and clivus mass in the skull base        2. Unilateral left ethmoid air cell mucosal thickening, expansion of the sphenoid sinus and clivus with a soft tissue mass measuring up to 4.1 x 2.9 x 3.2 cm. The mass extends anteriorly to the posterior aspect of the nasal cavity posteriorly breaching    the cortex of the clivus at the prepontine cistern without signs of any dural based extension. The posterior margin the clivus is breached. The mass destroys portions of the clivus and there is erosion of the floor of the pituitary fossa and erosion of    the posterior nasal sinus walls and bilateral cavernous sinus walls. Differential considerations would include sinonasal adenocarcinoma, undifferentiated carcinoma or lymphoma or less likely metastasis      CT SINUS FOR IMAGE GUIDANCE   Final Result      1. Normal-appearing intracranial vasculature with no encasement of the middle cerebral or anterior cerebral arterial structures with slight displacement mainly of the left cavernous carotid by the sinus and clivus mass in the skull base        2. Unilateral left ethmoid air cell mucosal thickening, expansion of the sphenoid sinus and clivus with a soft tissue mass measuring up to 4.1 x 2.9 x 3.2 cm. The mass extends anteriorly to the posterior aspect of the nasal cavity posteriorly breaching    the cortex of the clivus at the prepontine cistern without signs of any dural based extension. The posterior margin the clivus is breached. The mass destroys portions of the clivus and there is erosion of the floor of the pituitary fossa and erosion of    the posterior nasal sinus walls and bilateral cavernous sinus walls. Differential considerations would include sinonasal adenocarcinoma, undifferentiated carcinoma or lymphoma or less likely metastasis      MRI BRAIN W WO CONTRAST   Final Result      Enhancing neoplasm with bone destruction 3.7 x 3.2 x 2.7 cm centered at the sphenoid sinus and clivus with left cavernous sinus invasion and possible right cavernous sinus invasion extending anterior posterior from posterior aspect of nasal cavity to the    prepontine cistern causing destruction of floor of sella and mild mass effect on the pituitary gland. 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 Solitary lesion    makes bone metastasis less likely. The intermediate T2 characteristics are not typical for chordoma or chondrosarcoma.          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

## 2021-12-19 NOTE — PROGRESS NOTES
ONCOLOGY HEMATOLOGY CARE  PROGRESS NOTE    SUBJECTIVE:       Discussed with Dr. Laron Palomino yesterday, then Dr. Nabor Carson today. Recommended biopsy of right cervical lymphadenopathy (very bulky) as well as sphenoid sinus mass. PHYSICAL EXAM:       /87   Pulse 61   Temp 98.3 °F (36.8 °C) (Oral)   Resp 18   SpO2 98%       General appearance: Sleepy this am.  Appears older than stated age  [de-identified]: Normocephalic. Left eyelid droop, unable to move left eye laterally. No scleral icterus. Normal mucus membranes. Normal mouth exam  Neck: Large conglomerate very firm irregular matted right cervical adenopathy, about 6 x 7 cm in diameter  Lymph Nodes: No palpable lymph nodes in the left cervical supraclavicular, axillary areas. Lungs: Clear to auscultation. No wheezes, rales or rhonchi are heard. Heart: Regular rate and rhythm, S1, S2 normal  Abdomen: Soft, non-tender; bowel sounds normal; no masses, distention, ascites. Liver and spleen are not palpable. Extremities: without cyanosis, clubbing, edema or asymmetry  Skin: No jaundice, purpura or petechiae  Neuro:  See cranial nerve ENT description. Alert and oriented. Speech is normal.  Mentation is normal.  Patient is able to carry a complex conversation regarding their illness.   Musculoskeletal: No tenderness or swelling over any bones or joints noted  Psychiatric: depressed affect       MEDS:     Current Facility-Administered Medications   Medication Dose Route Frequency Provider Last Rate Last Admin    oxymetazoline (AFRIN) 0.05 % nasal spray 2 spray  2 spray Each Nostril BID PRN Corin Oh MD        melatonin disintegrating tablet 5 mg  5 mg Oral Nightly Corin Oh MD   5 mg at 12/18/21 0335    morphine (PF) injection 2 mg  2 mg IntraVENous Q4H PRN Corin Oh MD   2 mg at 12/17/21 1221    diphenhydrAMINE (BENADRYL) tablet 25 mg  25 mg Oral Q4H PRN Corin Oh MD   25 mg at 12/18/21 0000    promethazine (PHENERGAN) injection 25 mg 25 mg IntraVENous Q4H PRN Ruma Jesus MD   25 mg at 12/17/21 0211    pantoprazole (PROTONIX) injection 40 mg  40 mg IntraVENous Daily Breanne Ramirez MD   40 mg at 12/19/21 0830    sodium chloride flush 0.9 % injection 10 mL  10 mL IntraVENous 2 times per day Ruma Jesus MD   10 mL at 12/19/21 0830    sodium chloride flush 0.9 % injection 10 mL  10 mL IntraVENous PRN Ruma Jesus MD        0.9 % sodium chloride infusion  25 mL IntraVENous PRN Ruma Jesus MD        ondansetron [facility]) injection 4 mg  4 mg IntraVENous Q4H PRN Ruma Jesus MD        polyethylene glycol [facility]) packet 17 g  17 g Oral Daily PRN Ruma Jesus MD        acetaminophen (TYLENOL) tablet 650 mg  650 mg Oral Q4H PRN Ruma Jesus MD   650 mg at 12/19/21 0425    Or    acetaminophen (TYLENOL) suppository 650 mg  650 mg Rectal Q4H PRN Ruma Jesus MD        0.9 % sodium chloride infusion   IntraVENous Continuous Ruma Jesus MD 75 mL/hr at 12/17/21 2109 New Bag at 12/17/21 2109       LABS:     CBC:   Lab Results   Component Value Date    WBC 10.9 12/19/2021    HGB 13.7 12/19/2021    HCT 41.1 12/19/2021    MCV 80.1 12/19/2021     12/19/2021    LYMPHOPCT 11.2 12/19/2021    RBC 5.13 12/19/2021    MCH 26.6 12/19/2021    MCHC 33.3 12/19/2021    RDW 18.2 (H) 12/19/2021    NEUTOPHILPCT 79.0 12/19/2021    MONOPCT 9.0 12/19/2021    BASOPCT 0.4 12/19/2021    NEUTROABS 8.6 (H) 12/19/2021    LYMPHSABS 1.2 12/19/2021    MONOSABS 1.0 12/19/2021    EOSABS 0.0 12/19/2021    BASOSABS 0.0 12/19/2021       CMP:   Lab Results   Component Value Date     12/19/2021    K 4.6 12/19/2021    CL 99 12/19/2021    CO2 27 12/19/2021    BUN 17 12/19/2021    CREATININE 0.9 12/19/2021    GLUCOSE 187 12/19/2021    CALCIUM 9.2 12/19/2021    PROT 7.5 12/15/2021    LABALBU 4.2 12/15/2021    BILITOT 0.2 12/15/2021    ALKPHOS 72 12/15/2021    AST 22 12/15/2021    ALT 14 12/15/2021            IMAGING:     CT neck 12/18/2021  FINDINGS:       Nasopharynx: Upper portions Opacified with fluid and along the posterior nasopharynx there is a large mass eroding or occupying the clivus and medial aspect of the skull base anteriorly with soft tissue mass displacing the cavernous carotid artery on the    left as previously described on sinus CT reports with cortical bone destruction and expansion.        The masses previously seen measures at least 3.7 x 3.2 cm as measured on axial image 36. . No sign of any obstruction of the nasopharynx       Suprahyoid neck: Normal oropharynx, oral cavity, parapharyngeal space, and retropharyngeal space.       Lymph nodes:       There is massive soft tissue swelling beginning in the inferior  space with diffuse low-attenuation hypervascular enhancing lobular masses occupying the right neck from the angle of the mandible down all the way to the level of the    supraclavicular region either representing primary head and neck cancer or diffuse lymphadenopathy. This causes mild displacement of the right parapharyngeal region externally and displacement of the carotid space medially. . Overall enlargement of lymph    nodes at level 2 level 3 and level 4 with level 2 lymph nodes displacing the carotid space and measuring up to 6.9 x 5.2 cm on axial image 113. Overall extension on the coronal images is 13 cm in length of diffuse lymphadenopathy. There appears to be    some hyperdensity and calcification along the inferior masses in the lower neck and encasement of the sternocleidomastoid medial margins. The mass extends or manuel disease extends along the right thyroid displacing it slightly and at the level of the    thyroid there is soft tissue mass or lymphadenopathy measuring 4.2 x 4.1 cm as seen on axial image 196.       Infrahyoid neck: Normal larynx, hypopharynx, and supraglottis.  The thyroid appears displaced on the right side otherwise normal.                Vascular structures: Displaced return home to [place] and start treatment there.       Dave Sood MD

## 2021-12-19 NOTE — PROGRESS NOTES
[address] Follow Up    Patient Name: Raifa Albert Record Number:  [id number]    Date of Service: 12/19/2021    ASSESSMENT  David is a 60 y.o. male with a central skull mass with neck metastasis. PLAN  Will add on for OR tomorrow if time available. Excisional biopsy right neck mass for floww and pathology  Nasal endoscopy with removal of tissue sphenoid sinus. The patient has a diagnosis of sphenoid sinus mass which has not been responsive or cannot be treated with maximal medical therapy. The patient has been advised of options including further medical therapy, surgery, or nothing. The risks and benefits of surgery were described not limited to injury to the skull base, brain, stroke, hemorrhage, brain fluid leak, double vision, visual loss, epistaxis, and need for further surgical management of disease. Patient expectations during and after surgery including post-operative sinonasal care and pain control were described. Questions were answered and the patient agreed to proceed with surgery which will be scheduled in an appropriate time frame in line with the severity of disease. The patient has a diagnosis of Neck mass which has not been responsive or cannot be treated with maximal medical therapy. The patient has been advised of options including further medical therapy, surgery, or nothing. The risks and benefits of surgery were described not limited to infection, bleeding, airway and pharyngeal fistula, scars including hypertrophic and keloids, recurrence of disease  and need for further surgical management of disease. Patient expectations during and after surgery including post-operative care and pain control were described. Questions were answered and the patient agreed to proceed with surgery which will be scheduled in an appropriate time frame in line with the severity of disease.         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 male who presents with a 4 day history of headaches, cough and hematemesis. Imaging in hospital of head showed mass in central skull base in the sphenoid sinus, cavernous sinus, sella and clivus. Symptoms include as above  The patient is currently in fair  medical condition  The tumor was discovered yesterday on imaging in hospital day(s) ago by radiology  The patient has received no treatment prior to today's visit  Nasal Discharge:  none  Nasal Obstruction: no   Post Nasal Drainage:  no  Nasal Bleeding:  no  Sense of Smell:  no  HIstory of Facial/Head Trauma: No  Pain/Discomfort: yes, headache  Onset: 4 days  Eye Symptoms:   denies     Patient Active Problem List   Diagnosis    [facility]     No past surgical history on file. No family history on file. Social History     Socioeconomic History    Marital status: Single     Spouse name: Not on file    Number of children: Not on file    Years of education: Not on file    Highest education level: Not on file   Occupational History    Not on file   Tobacco Use    Smoking status: Former Smoker    Smokeless tobacco: Never Used   Substance and Sexual Activity    Alcohol use: Not Currently    Drug use: Not Currently    Sexual activity: Not on file   Other Topics Concern    Not on file   Social History Narrative    Not on file     Social Determinants of Health     Financial Resource Strain:     Difficulty of Paying Living Expenses: Not on file   Food Insecurity:     Worried About [address] in the Last Year: Not on file    Ran Out of Food in the Last Year: Not on file   Transportation Needs:     Lack of Transportation (Medical): Not on file    Lack of Transportation (Non-Medical):  Not on file   Physical Activity:     Days of Exercise per Week: Not on file    Minutes of Exercise per Session: Not on file   Stress:     Feeling of Stress : Not on file Social Connections:     Frequency of Communication with Friends and Family: Not on file    Frequency of Social Gatherings with Friends and Family: Not on file    Attends [employer] Services: Not on file    Active Member of Clubs or Organizations: Not on file    Attends Club or Organization Meetings: Not on file    Marital Status: Not on file   Intimate Partner Violence:     Fear of Current or Ex-Partner: Not on file    Emotionally Abused: Not on file    Physically Abused: Not on file    Sexually Abused: Not on file   Housing Stability:     Unable to Pay for Housing in the Last Year: Not on file    Number of [place] in the Last Year: Not on file    Unstable Housing in the Last Year: Not on file       DRUG/FOOD ALLERGIES: Patient has no known allergies. CURRENT MEDICATIONS:     Medication List      You have not been prescribed any medications.            PHYSICAL EXAM  /87   Pulse 61   Temp 98.3 °F (36.8 °C) (Oral)   Resp 18   SpO2 98%     GENERAL: No Acute Distress, Alert and Oriented  EYES: PERRLA, EOMI, Anti-icteric  NOSE: No epistaxis, nasal mucosa within normal limits, no purulent drainage  EARS: Normal external canal appearance, EAC patent bilaterally, TMs bilaterally within normal limits with normal light reflex  FACE: 1/6 House-Brackmann Scale, symmetric, sensation equal bilaterally  ORAL CAVITY: No masses or lesions palpated, uvula is midline, moist mucous membranes  NECK: Normal range of motion, no thyromegaly, trachea is midline, Large right level 2/3  lymphadenopathy, no crepitus  CHEST: Normal respiratory effort, no retractions    LABS  0 Result Notes     Ref Range & Units 12/19/21 0827   WBC 4.0 - 11.0 K/uL 10.9    RBC 4.20 - 5.90 M/uL 5.13    Hemoglobin 13.5 - 17.5 g/dL 13.7    Hematocrit 40.5 - 52.5 % 41.1    MCV 80.0 - 100.0 fL 80.1    MCH 26.0 - 34.0 pg 26.6    MCHC 31.0 - 36.0 g/dL 33.3    RDW 12.4 - 15.4 % 18.2 High     Platelets 154 - 800 K/uL 405    MPV 5.0 - 10.5 fL 7.8 Neutrophils % % 79.0    Lymphocytes % % 11.2    Monocytes % % 9.0    Eosinophils % % 0.4    Basophils % % 0.4    Neutrophils Absolute 1.7 - 7.7 K/uL 8.6 High     Lymphocytes Absolute 1.0 - 5.1 K/uL 1.2    Monocytes Absolute 0.0 - 1.3 K/uL 1.0    Eosinophils Absolute 0.0 - 0.6 K/uL 0.0    Basophils Absolute 0.0 - 0.2 K/uL 0.0               PROCEDURE  None

## 2021-12-19 NOTE — PROGRESS NOTES
Neurosurgery Progress Note    2021 10:20 AM                               David Shafer                      LOS: 3 days               No acute events overnight. Reports improvement in headaches. Neurologically intact on exam. MRI with large mass in sphenoid sinus with invasion of cavernous sinus on left and bony destruction. CT chest/abd/pelvis with numerous nodules/lymphadenopathy of unclear etiology.      Subjective:                                            Physical Exam:    Temp (24hrs), Av.3 °F (36.8 °C), Min:98.1 °F (36.7 °C), Max:98.7 °F (37.1 °C)          Labs:  Recent Labs     21  0827   WBC 10.9   HGB 13.7   HCT 41.1          Recent Labs     21  0827   *   K 4.6   CL 99   CO2 27   BUN 17   CREATININE 0.9   GLUCOSE 187*   CALCIUM 9.2       No results for input(s): PROTIME, INR, APTT in the last 72 hours. A/P: 61 yo smoker with headache. Imaging with large enhancing mass in sphenoid/ethmoid sinuses with cavernous sinus and sellar extension.     -Neuro stable  -Frequent neuro checks  -HOB elevated  -Pain control with PO meds  -Pituitary labs pending  -ENT following  -GI following for stomach ulcer. On PPI. Will hold on steroids for now. -Pending labs, will likely plan for transnasal biopsy later this week possibly tomorrow . Will discuss with ENT.    -DVT ppx  -Will follow      Flory Rosales PA-C

## 2021-12-19 NOTE — PROGRESS NOTES
Pt caller out regarding having a nose bleed. Upon entering the room there was a significant amount of blood on the floor and pt. The pt nose was actively bleeding. Gave wash cloth to pt and told him to pinch his nose. A clot noted at the left nares. I messaged the hospitalist to notify, no response at this time. Clean the floor and gave pt clean pants and changed pt bedding. Pt now complaining of a headache. Gave Tylenol. Will continue to monitor.

## 2021-12-19 NOTE — PROGRESS NOTES
Patient A&Ox4, VSS. Neuro checks unchanged, pt continues to report left facial numbness with left eye droop. Patient has intermittent headaches that are relived with Tylenol. Patient tolerating diet well, denies nausea. Patient ambulating with standby assist, voiding adequately. Patient NPO at midnight in anticipation of OR tomorrow afternoon. Will continue to monitor.

## 2021-12-20 ENCOUNTER — ANESTHESIA (OUTPATIENT)
Dept: OPERATING ROOM | Age: 56
DRG: 143 | End: 2021-12-20

## 2021-12-20 ENCOUNTER — ANESTHESIA EVENT (OUTPATIENT)
Dept: OPERATING ROOM | Age: 56
DRG: 143 | End: 2021-12-20

## 2021-12-20 VITALS — SYSTOLIC BLOOD PRESSURE: 125 MMHG | OXYGEN SATURATION: 94 % | DIASTOLIC BLOOD PRESSURE: 79 MMHG | TEMPERATURE: 96.4 F

## 2021-12-20 LAB
ANION GAP SERPL CALCULATED.3IONS-SCNC: 6 MMOL/L (ref 3–16)
BASOPHILS ABSOLUTE: 0.1 K/UL (ref 0–0.2)
BASOPHILS RELATIVE PERCENT: 0.7 %
BUN BLDV-MCNC: 18 MG/DL (ref 7–20)
CALCIUM SERPL-MCNC: 8.9 MG/DL (ref 8.3–10.6)
CHLORIDE BLD-SCNC: 98 MMOL/L (ref 99–110)
CO2: 30 MMOL/L (ref 21–32)
CREAT SERPL-MCNC: 0.9 MG/DL (ref 0.9–1.3)
EOSINOPHILS ABSOLUTE: 0 K/UL (ref 0–0.6)
EOSINOPHILS RELATIVE PERCENT: 0.4 %
GFR AFRICAN AMERICAN: >60
GFR NON-AFRICAN AMERICAN: >60
GLUCOSE BLD-MCNC: 103 MG/DL (ref 70–99)
HCT VFR BLD CALC: 38.5 % (ref 40.5–52.5)
HEMOGLOBIN: 12.9 G/DL (ref 13.5–17.5)
LYMPHOCYTES ABSOLUTE: 1.8 K/UL (ref 1–5.1)
LYMPHOCYTES RELATIVE PERCENT: 15.2 %
MCH RBC QN AUTO: 26.8 PG (ref 26–34)
MCHC RBC AUTO-ENTMCNC: 33.5 G/DL (ref 31–36)
MCV RBC AUTO: 80.1 FL (ref 80–100)
MONOCYTES ABSOLUTE: 1.4 K/UL (ref 0–1.3)
MONOCYTES RELATIVE PERCENT: 11.8 %
NEUTROPHILS ABSOLUTE: 8.4 K/UL (ref 1.7–7.7)
NEUTROPHILS RELATIVE PERCENT: 71.9 %
PDW BLD-RTO: 18.4 % (ref 12.4–15.4)
PLATELET # BLD: 397 K/UL (ref 135–450)
PMV BLD AUTO: 7.9 FL (ref 5–10.5)
POTASSIUM REFLEX MAGNESIUM: 4.8 MMOL/L (ref 3.5–5.1)
RBC # BLD: 4.81 M/UL (ref 4.2–5.9)
SODIUM BLD-SCNC: 134 MMOL/L (ref 136–145)
WBC # BLD: 11.6 K/UL (ref 4–11)

## 2021-12-20 PROCEDURE — 3600000004 HC SURGERY LEVEL 4 BASE: Performed by: OTOLARYNGOLOGY

## 2021-12-20 PROCEDURE — 6360000002 HC RX W HCPCS: Performed by: NURSE ANESTHETIST, CERTIFIED REGISTERED

## 2021-12-20 PROCEDURE — 88305 TISSUE EXAM BY PATHOLOGIST: CPT

## 2021-12-20 PROCEDURE — 6360000002 HC RX W HCPCS: Performed by: STUDENT IN AN ORGANIZED HEALTH CARE EDUCATION/TRAINING PROGRAM

## 2021-12-20 PROCEDURE — 36415 COLL VENOUS BLD VENIPUNCTURE: CPT

## 2021-12-20 PROCEDURE — 2580000003 HC RX 258: Performed by: OTOLARYNGOLOGY

## 2021-12-20 PROCEDURE — 09BW4ZX EXCISION OF RIGHT SPHENOID SINUS, PERCUTANEOUS ENDOSCOPIC APPROACH, DIAGNOSTIC: ICD-10-PCS | Performed by: INTERNAL MEDICINE

## 2021-12-20 PROCEDURE — 07B10ZX EXCISION OF RIGHT NECK LYMPHATIC, OPEN APPROACH, DIAGNOSTIC: ICD-10-PCS | Performed by: INTERNAL MEDICINE

## 2021-12-20 PROCEDURE — 2720000010 HC SURG SUPPLY STERILE: Performed by: OTOLARYNGOLOGY

## 2021-12-20 PROCEDURE — 6370000000 HC RX 637 (ALT 250 FOR IP): Performed by: OTOLARYNGOLOGY

## 2021-12-20 PROCEDURE — C9113 INJ PANTOPRAZOLE SODIUM, VIA: HCPCS | Performed by: STUDENT IN AN ORGANIZED HEALTH CARE EDUCATION/TRAINING PROGRAM

## 2021-12-20 PROCEDURE — 3700000000 HC ANESTHESIA ATTENDED CARE: Performed by: OTOLARYNGOLOGY

## 2021-12-20 PROCEDURE — 88331 PATH CONSLTJ SURG 1 BLK 1SPC: CPT

## 2021-12-20 PROCEDURE — 2060000000 HC ICU INTERMEDIATE R&B

## 2021-12-20 PROCEDURE — 6370000000 HC RX 637 (ALT 250 FOR IP): Performed by: INTERNAL MEDICINE

## 2021-12-20 PROCEDURE — 85025 COMPLETE CBC W/AUTO DIFF WBC: CPT

## 2021-12-20 PROCEDURE — 2709999900 HC NON-CHARGEABLE SUPPLY: Performed by: OTOLARYNGOLOGY

## 2021-12-20 PROCEDURE — 6360000002 HC RX W HCPCS: Performed by: INTERNAL MEDICINE

## 2021-12-20 PROCEDURE — 2500000003 HC RX 250 WO HCPCS: Performed by: NURSE ANESTHETIST, CERTIFIED REGISTERED

## 2021-12-20 PROCEDURE — 2580000003 HC RX 258: Performed by: NURSE ANESTHETIST, CERTIFIED REGISTERED

## 2021-12-20 PROCEDURE — 2580000003 HC RX 258: Performed by: INTERNAL MEDICINE

## 2021-12-20 PROCEDURE — 3600000014 HC SURGERY LEVEL 4 ADDTL 15MIN: Performed by: OTOLARYNGOLOGY

## 2021-12-20 PROCEDURE — 7100000000 HC PACU RECOVERY - FIRST 15 MIN: Performed by: OTOLARYNGOLOGY

## 2021-12-20 PROCEDURE — 88341 IMHCHEM/IMCYTCHM EA ADD ANTB: CPT

## 2021-12-20 PROCEDURE — 80048 BASIC METABOLIC PNL TOTAL CA: CPT

## 2021-12-20 PROCEDURE — 31288 NASAL/SINUS ENDOSCOPY SURG: CPT | Performed by: OTOLARYNGOLOGY

## 2021-12-20 PROCEDURE — 3700000001 HC ADD 15 MINUTES (ANESTHESIA): Performed by: OTOLARYNGOLOGY

## 2021-12-20 PROCEDURE — 88342 IMHCHEM/IMCYTCHM 1ST ANTB: CPT

## 2021-12-20 PROCEDURE — 7100000001 HC PACU RECOVERY - ADDTL 15 MIN: Performed by: OTOLARYNGOLOGY

## 2021-12-20 PROCEDURE — 21550 BIOPSY OF NECK/CHEST: CPT | Performed by: OTOLARYNGOLOGY

## 2021-12-20 PROCEDURE — 2500000003 HC RX 250 WO HCPCS: Performed by: OTOLARYNGOLOGY

## 2021-12-20 RX ORDER — LIDOCAINE HYDROCHLORIDE AND EPINEPHRINE 10; 10 MG/ML; UG/ML
INJECTION, SOLUTION INFILTRATION; PERINEURAL PRN
Status: DISCONTINUED | OUTPATIENT
Start: 2021-12-20 | End: 2021-12-20 | Stop reason: ALTCHOICE

## 2021-12-20 RX ORDER — HYDRALAZINE HYDROCHLORIDE 20 MG/ML
5 INJECTION INTRAMUSCULAR; INTRAVENOUS EVERY 10 MIN PRN
Status: DISCONTINUED | OUTPATIENT
Start: 2021-12-20 | End: 2021-12-20 | Stop reason: HOSPADM

## 2021-12-20 RX ORDER — MORPHINE SULFATE 4 MG/ML
1 INJECTION, SOLUTION INTRAMUSCULAR; INTRAVENOUS EVERY 5 MIN PRN
Status: DISCONTINUED | OUTPATIENT
Start: 2021-12-20 | End: 2021-12-22 | Stop reason: HOSPADM

## 2021-12-20 RX ORDER — MIDAZOLAM HYDROCHLORIDE 1 MG/ML
INJECTION INTRAMUSCULAR; INTRAVENOUS PRN
Status: DISCONTINUED | OUTPATIENT
Start: 2021-12-20 | End: 2021-12-20 | Stop reason: SDUPTHER

## 2021-12-20 RX ORDER — METOCLOPRAMIDE HYDROCHLORIDE 5 MG/ML
10 INJECTION INTRAMUSCULAR; INTRAVENOUS
Status: ACTIVE | OUTPATIENT
Start: 2021-12-20 | End: 2021-12-20

## 2021-12-20 RX ORDER — MEPERIDINE HYDROCHLORIDE 25 MG/ML
12.5 INJECTION INTRAMUSCULAR; INTRAVENOUS; SUBCUTANEOUS EVERY 5 MIN PRN
Status: DISCONTINUED | OUTPATIENT
Start: 2021-12-20 | End: 2021-12-22 | Stop reason: HOSPADM

## 2021-12-20 RX ORDER — MORPHINE SULFATE 4 MG/ML
1 INJECTION, SOLUTION INTRAMUSCULAR; INTRAVENOUS EVERY 5 MIN PRN
Status: DISCONTINUED | OUTPATIENT
Start: 2021-12-20 | End: 2021-12-20 | Stop reason: HOSPADM

## 2021-12-20 RX ORDER — MAGNESIUM HYDROXIDE 1200 MG/15ML
LIQUID ORAL CONTINUOUS PRN
Status: COMPLETED | OUTPATIENT
Start: 2021-12-20 | End: 2021-12-20

## 2021-12-20 RX ORDER — PROPOFOL 10 MG/ML
INJECTION, EMULSION INTRAVENOUS PRN
Status: DISCONTINUED | OUTPATIENT
Start: 2021-12-20 | End: 2021-12-20 | Stop reason: SDUPTHER

## 2021-12-20 RX ORDER — ONDANSETRON 2 MG/ML
INJECTION INTRAMUSCULAR; INTRAVENOUS PRN
Status: DISCONTINUED | OUTPATIENT
Start: 2021-12-20 | End: 2021-12-20 | Stop reason: SDUPTHER

## 2021-12-20 RX ORDER — OXYCODONE HYDROCHLORIDE 5 MG/1
10 TABLET ORAL PRN
Status: DISCONTINUED | OUTPATIENT
Start: 2021-12-20 | End: 2021-12-20 | Stop reason: HOSPADM

## 2021-12-20 RX ORDER — FENTANYL CITRATE 50 UG/ML
INJECTION, SOLUTION INTRAMUSCULAR; INTRAVENOUS PRN
Status: DISCONTINUED | OUTPATIENT
Start: 2021-12-20 | End: 2021-12-20 | Stop reason: SDUPTHER

## 2021-12-20 RX ORDER — ROCURONIUM BROMIDE 10 MG/ML
INJECTION, SOLUTION INTRAVENOUS PRN
Status: DISCONTINUED | OUTPATIENT
Start: 2021-12-20 | End: 2021-12-20 | Stop reason: SDUPTHER

## 2021-12-20 RX ORDER — HYDRALAZINE HYDROCHLORIDE 20 MG/ML
5 INJECTION INTRAMUSCULAR; INTRAVENOUS EVERY 10 MIN PRN
Status: DISCONTINUED | OUTPATIENT
Start: 2021-12-20 | End: 2021-12-22 | Stop reason: HOSPADM

## 2021-12-20 RX ORDER — LABETALOL HYDROCHLORIDE 5 MG/ML
5 INJECTION, SOLUTION INTRAVENOUS EVERY 10 MIN PRN
Status: DISCONTINUED | OUTPATIENT
Start: 2021-12-20 | End: 2021-12-22 | Stop reason: HOSPADM

## 2021-12-20 RX ORDER — LIDOCAINE HYDROCHLORIDE 20 MG/ML
INJECTION, SOLUTION INTRAVENOUS PRN
Status: DISCONTINUED | OUTPATIENT
Start: 2021-12-20 | End: 2021-12-20 | Stop reason: SDUPTHER

## 2021-12-20 RX ORDER — OXYCODONE HYDROCHLORIDE 5 MG/1
10 TABLET ORAL PRN
Status: ACTIVE | OUTPATIENT
Start: 2021-12-20 | End: 2021-12-20

## 2021-12-20 RX ORDER — OXYMETAZOLINE HYDROCHLORIDE 0.05 G/100ML
SPRAY NASAL PRN
Status: DISCONTINUED | OUTPATIENT
Start: 2021-12-20 | End: 2021-12-20 | Stop reason: ALTCHOICE

## 2021-12-20 RX ORDER — LABETALOL HYDROCHLORIDE 5 MG/ML
5 INJECTION, SOLUTION INTRAVENOUS EVERY 10 MIN PRN
Status: DISCONTINUED | OUTPATIENT
Start: 2021-12-20 | End: 2021-12-20 | Stop reason: HOSPADM

## 2021-12-20 RX ORDER — DIPHENHYDRAMINE HYDROCHLORIDE 50 MG/ML
12.5 INJECTION INTRAMUSCULAR; INTRAVENOUS
Status: DISCONTINUED | OUTPATIENT
Start: 2021-12-20 | End: 2021-12-20 | Stop reason: HOSPADM

## 2021-12-20 RX ORDER — DIPHENHYDRAMINE HYDROCHLORIDE 50 MG/ML
12.5 INJECTION INTRAMUSCULAR; INTRAVENOUS
Status: ACTIVE | OUTPATIENT
Start: 2021-12-20 | End: 2021-12-20

## 2021-12-20 RX ORDER — PROMETHAZINE HYDROCHLORIDE 25 MG/ML
6.25 INJECTION, SOLUTION INTRAMUSCULAR; INTRAVENOUS
Status: DISCONTINUED | OUTPATIENT
Start: 2021-12-20 | End: 2021-12-20 | Stop reason: HOSPADM

## 2021-12-20 RX ORDER — ESMOLOL HYDROCHLORIDE 10 MG/ML
INJECTION INTRAVENOUS PRN
Status: DISCONTINUED | OUTPATIENT
Start: 2021-12-20 | End: 2021-12-20 | Stop reason: SDUPTHER

## 2021-12-20 RX ORDER — PROMETHAZINE HYDROCHLORIDE 25 MG/ML
6.25 INJECTION, SOLUTION INTRAMUSCULAR; INTRAVENOUS
Status: ACTIVE | OUTPATIENT
Start: 2021-12-20 | End: 2021-12-20

## 2021-12-20 RX ORDER — DEXAMETHASONE SODIUM PHOSPHATE 4 MG/ML
INJECTION, SOLUTION INTRA-ARTICULAR; INTRALESIONAL; INTRAMUSCULAR; INTRAVENOUS; SOFT TISSUE PRN
Status: DISCONTINUED | OUTPATIENT
Start: 2021-12-20 | End: 2021-12-20 | Stop reason: SDUPTHER

## 2021-12-20 RX ORDER — EPINEPHRINE NASAL SOLUTION 1 MG/ML
SOLUTION NASAL PRN
Status: DISCONTINUED | OUTPATIENT
Start: 2021-12-20 | End: 2021-12-20 | Stop reason: ALTCHOICE

## 2021-12-20 RX ORDER — METOCLOPRAMIDE HYDROCHLORIDE 5 MG/ML
10 INJECTION INTRAMUSCULAR; INTRAVENOUS
Status: DISCONTINUED | OUTPATIENT
Start: 2021-12-20 | End: 2021-12-20 | Stop reason: HOSPADM

## 2021-12-20 RX ORDER — MEPERIDINE HYDROCHLORIDE 25 MG/ML
12.5 INJECTION INTRAMUSCULAR; INTRAVENOUS; SUBCUTANEOUS EVERY 5 MIN PRN
Status: DISCONTINUED | OUTPATIENT
Start: 2021-12-20 | End: 2021-12-20 | Stop reason: HOSPADM

## 2021-12-20 RX ORDER — OXYCODONE HYDROCHLORIDE 5 MG/1
5 TABLET ORAL PRN
Status: DISCONTINUED | OUTPATIENT
Start: 2021-12-20 | End: 2021-12-20 | Stop reason: HOSPADM

## 2021-12-20 RX ORDER — CEFAZOLIN SODIUM 2 G/50ML
SOLUTION INTRAVENOUS PRN
Status: DISCONTINUED | OUTPATIENT
Start: 2021-12-20 | End: 2021-12-20 | Stop reason: SDUPTHER

## 2021-12-20 RX ORDER — OXYCODONE HYDROCHLORIDE 5 MG/1
5 TABLET ORAL PRN
Status: ACTIVE | OUTPATIENT
Start: 2021-12-20 | End: 2021-12-20

## 2021-12-20 RX ORDER — PHENYLEPHRINE HYDROCHLORIDE 10 MG/ML
INJECTION INTRAVENOUS PRN
Status: DISCONTINUED | OUTPATIENT
Start: 2021-12-20 | End: 2021-12-20 | Stop reason: SDUPTHER

## 2021-12-20 RX ORDER — SODIUM CHLORIDE, SODIUM LACTATE, POTASSIUM CHLORIDE, CALCIUM CHLORIDE 600; 310; 30; 20 MG/100ML; MG/100ML; MG/100ML; MG/100ML
INJECTION, SOLUTION INTRAVENOUS CONTINUOUS PRN
Status: DISCONTINUED | OUTPATIENT
Start: 2021-12-20 | End: 2021-12-20 | Stop reason: SDUPTHER

## 2021-12-20 RX ADMIN — SODIUM CHLORIDE, PRESERVATIVE FREE 10 ML: 5 INJECTION INTRAVENOUS at 22:22

## 2021-12-20 RX ADMIN — DIPHENHYDRAMINE HYDROCHLORIDE 25 MG: 25 TABLET ORAL at 03:44

## 2021-12-20 RX ADMIN — ROCURONIUM BROMIDE 50 MG: 10 INJECTION INTRAVENOUS at 14:33

## 2021-12-20 RX ADMIN — ACETAMINOPHEN 650 MG: 325 TABLET ORAL at 11:56

## 2021-12-20 RX ADMIN — PANTOPRAZOLE SODIUM 40 MG: 40 INJECTION, POWDER, FOR SOLUTION INTRAVENOUS at 09:12

## 2021-12-20 RX ADMIN — ONDANSETRON 8 MG: 2 INJECTION INTRAMUSCULAR; INTRAVENOUS at 14:33

## 2021-12-20 RX ADMIN — DIPHENHYDRAMINE HYDROCHLORIDE 25 MG: 25 TABLET ORAL at 22:22

## 2021-12-20 RX ADMIN — PHENYLEPHRINE HYDROCHLORIDE 100 MCG: 10 INJECTION INTRAVENOUS at 14:56

## 2021-12-20 RX ADMIN — SUGAMMADEX 200 MG: 100 INJECTION, SOLUTION INTRAVENOUS at 15:48

## 2021-12-20 RX ADMIN — LIDOCAINE HYDROCHLORIDE 100 MG: 20 INJECTION, SOLUTION INTRAVENOUS at 14:33

## 2021-12-20 RX ADMIN — Medication 5 MG: at 22:22

## 2021-12-20 RX ADMIN — MORPHINE SULFATE 2 MG: 2 INJECTION, SOLUTION INTRAMUSCULAR; INTRAVENOUS at 03:45

## 2021-12-20 RX ADMIN — CEFAZOLIN SODIUM 2000 MG: 2 SOLUTION INTRAVENOUS at 14:41

## 2021-12-20 RX ADMIN — ESMOLOL HYDROCHLORIDE 20 MG: 10 INJECTION, SOLUTION INTRAVENOUS at 14:42

## 2021-12-20 RX ADMIN — SODIUM CHLORIDE: 9 INJECTION, SOLUTION INTRAVENOUS at 03:50

## 2021-12-20 RX ADMIN — DEXAMETHASONE SODIUM PHOSPHATE 12 MG: 4 INJECTION, SOLUTION INTRAMUSCULAR; INTRAVENOUS at 14:33

## 2021-12-20 RX ADMIN — SODIUM CHLORIDE, SODIUM LACTATE, POTASSIUM CHLORIDE, AND CALCIUM CHLORIDE: .6; .31; .03; .02 INJECTION, SOLUTION INTRAVENOUS at 15:07

## 2021-12-20 RX ADMIN — FENTANYL CITRATE 50 MCG: 50 INJECTION, SOLUTION INTRAMUSCULAR; INTRAVENOUS at 15:04

## 2021-12-20 RX ADMIN — MIDAZOLAM HYDROCHLORIDE 2 MG: 2 INJECTION, SOLUTION INTRAMUSCULAR; INTRAVENOUS at 14:23

## 2021-12-20 RX ADMIN — OXYMETAZOLINE HYDROCHLORIDE 2 SPRAY: 5 SPRAY NASAL at 10:03

## 2021-12-20 RX ADMIN — PROPOFOL 200 MG: 10 INJECTION, EMULSION INTRAVENOUS at 14:33

## 2021-12-20 RX ADMIN — SODIUM CHLORIDE, PRESERVATIVE FREE 10 ML: 5 INJECTION INTRAVENOUS at 09:12

## 2021-12-20 RX ADMIN — PHENYLEPHRINE HYDROCHLORIDE 100 MCG: 10 INJECTION INTRAVENOUS at 14:44

## 2021-12-20 RX ADMIN — PHENYLEPHRINE HYDROCHLORIDE 100 MCG: 10 INJECTION INTRAVENOUS at 15:10

## 2021-12-20 RX ADMIN — FENTANYL CITRATE 100 MCG: 50 INJECTION, SOLUTION INTRAMUSCULAR; INTRAVENOUS at 14:33

## 2021-12-20 ASSESSMENT — PULMONARY FUNCTION TESTS
PIF_VALUE: 16
PIF_VALUE: 16
PIF_VALUE: 14
PIF_VALUE: 18
PIF_VALUE: 16
PIF_VALUE: 15
PIF_VALUE: 13
PIF_VALUE: 13
PIF_VALUE: 1
PIF_VALUE: 15
PIF_VALUE: 16
PIF_VALUE: 15
PIF_VALUE: 2
PIF_VALUE: 13
PIF_VALUE: 15
PIF_VALUE: 2
PIF_VALUE: 2
PIF_VALUE: 0
PIF_VALUE: 16
PIF_VALUE: 13
PIF_VALUE: 15
PIF_VALUE: 14
PIF_VALUE: 13
PIF_VALUE: 18
PIF_VALUE: 16
PIF_VALUE: 18
PIF_VALUE: 15
PIF_VALUE: 13
PIF_VALUE: 15
PIF_VALUE: 17
PIF_VALUE: 15
PIF_VALUE: 14
PIF_VALUE: 0
PIF_VALUE: 15
PIF_VALUE: 13
PIF_VALUE: 1
PIF_VALUE: 15
PIF_VALUE: 14
PIF_VALUE: 17
PIF_VALUE: 16
PIF_VALUE: 26
PIF_VALUE: 15
PIF_VALUE: 16
PIF_VALUE: 18
PIF_VALUE: 1
PIF_VALUE: 15
PIF_VALUE: 13
PIF_VALUE: 17
PIF_VALUE: 15
PIF_VALUE: 13
PIF_VALUE: 16
PIF_VALUE: 15
PIF_VALUE: 13
PIF_VALUE: 16
PIF_VALUE: 25
PIF_VALUE: 14
PIF_VALUE: 14
PIF_VALUE: 29
PIF_VALUE: 15
PIF_VALUE: 3
PIF_VALUE: 14
PIF_VALUE: 15
PIF_VALUE: 15
PIF_VALUE: 1
PIF_VALUE: 14
PIF_VALUE: 13
PIF_VALUE: 26
PIF_VALUE: 15
PIF_VALUE: 18
PIF_VALUE: 25
PIF_VALUE: 18
PIF_VALUE: 15
PIF_VALUE: 26
PIF_VALUE: 13
PIF_VALUE: 16
PIF_VALUE: 15
PIF_VALUE: 16
PIF_VALUE: 16
PIF_VALUE: 2
PIF_VALUE: 16
PIF_VALUE: 15
PIF_VALUE: 0
PIF_VALUE: 15
PIF_VALUE: 15
PIF_VALUE: 14

## 2021-12-20 ASSESSMENT — PAIN DESCRIPTION - ONSET: ONSET: GRADUAL

## 2021-12-20 ASSESSMENT — PAIN SCALES - GENERAL
PAINLEVEL_OUTOF10: 6
PAINLEVEL_OUTOF10: 0
PAINLEVEL_OUTOF10: 6
PAINLEVEL_OUTOF10: 0

## 2021-12-20 ASSESSMENT — PAIN DESCRIPTION - FREQUENCY: FREQUENCY: INTERMITTENT

## 2021-12-20 ASSESSMENT — PAIN DESCRIPTION - LOCATION: LOCATION: HEAD

## 2021-12-20 ASSESSMENT — PAIN DESCRIPTION - PAIN TYPE: TYPE: ACUTE PAIN

## 2021-12-20 ASSESSMENT — PAIN DESCRIPTION - DESCRIPTORS: DESCRIPTORS: HEADACHE

## 2021-12-20 ASSESSMENT — PAIN DESCRIPTION - ORIENTATION: ORIENTATION: ANTERIOR

## 2021-12-20 ASSESSMENT — PAIN - FUNCTIONAL ASSESSMENT: PAIN_FUNCTIONAL_ASSESSMENT: ACTIVITIES ARE NOT PREVENTED

## 2021-12-20 ASSESSMENT — PAIN DESCRIPTION - PROGRESSION: CLINICAL_PROGRESSION: GRADUALLY WORSENING

## 2021-12-20 NOTE — ANESTHESIA POSTPROCEDURE EVALUATION
Department of Anesthesiology  Postprocedure Note    Patient: Akosua Holman  MRN: [id number]  YOB: 1965  Date of evaluation: 12/20/2021  Time:  6:10 PM     Procedure Summary     Date: 12/20/21 Room / Location: [address] [place] [address]    Anesthesia Start: 4655 Anesthesia Stop: 1559    Procedures:       EXCISION BIOPSY RIGHT NECK MASS (Right )      NASAL ENDOSCOPY WITH REMOVAL TISSUE RIGHT SPHENOID SINUS (N/A ) Diagnosis: (central skull mass with neck metastasis)    Surgeons: Wu Shane MD Responsible Provider: Lisbeth Otero MD    Anesthesia Type: general ASA Status: 2          Anesthesia Type: No value filed. Joselyn Phase I: Joselyn Score: 10    Joselyn Phase II:      Last vitals: Reviewed and per EMR flowsheets.        Anesthesia Post Evaluation    Patient location during evaluation: PACU  Patient participation: complete - patient participated  Level of consciousness: awake and alert  Pain score: 0  Airway patency: patent  Nausea & Vomiting: no nausea and no vomiting  Complications: no  Cardiovascular status: hemodynamically stable  Respiratory status: acceptable  Hydration status: euvolemic

## 2021-12-20 NOTE — PROGRESS NOTES
12/15/2021    ALT 14 12/15/2021            IMAGING:     CT neck 12/18/2021  FINDINGS:       Nasopharynx: Upper portions Opacified with fluid and along the posterior nasopharynx there is a large mass eroding or occupying the clivus and medial aspect of the skull base anteriorly with soft tissue mass displacing the cavernous carotid artery on the    left as previously described on sinus CT reports with cortical bone destruction and expansion.        The masses previously seen measures at least 3.7 x 3.2 cm as measured on axial image 36. . No sign of any obstruction of the nasopharynx       Suprahyoid neck: Normal oropharynx, oral cavity, parapharyngeal space, and retropharyngeal space.       Lymph nodes:       There is massive soft tissue swelling beginning in the inferior  space with diffuse low-attenuation hypervascular enhancing lobular masses occupying the right neck from the angle of the mandible down all the way to the level of the    supraclavicular region either representing primary head and neck cancer or diffuse lymphadenopathy. This causes mild displacement of the right parapharyngeal region externally and displacement of the carotid space medially. . Overall enlargement of lymph    nodes at level 2 level 3 and level 4 with level 2 lymph nodes displacing the carotid space and measuring up to 6.9 x 5.2 cm on axial image 113. Overall extension on the coronal images is 13 cm in length of diffuse lymphadenopathy. There appears to be    some hyperdensity and calcification along the inferior masses in the lower neck and encasement of the sternocleidomastoid medial margins. The mass extends or manuel disease extends along the right thyroid displacing it slightly and at the level of the    thyroid there is soft tissue mass or lymphadenopathy measuring 4.2 x 4.1 cm as seen on axial image 196.       Infrahyoid neck: Normal larynx, hypopharynx, and supraglottis.  The thyroid appears displaced on the right side otherwise normal.                Vascular structures: Displaced on the right side the carotid space and vessels medially due to the large manuel disease or masses       Thoracic inlet: The visualized portions of the lungs are free from segmental consolidation however small nodular changes are noted in the right upper lobe which could be metastatic disease. There are also some small nodules left upper lobe as seen on    image 240.        Bones: Demineralized bony structures and advanced cervical spondylosis appreciated. Lytic destruction noted in the clivus as previously described           Impression       Extensive necrotic in hypervascular enhancing masses or lymphadenopathy in the right neck extending all the way from the  space to the supraclavicular region with extensive enhancing solid mass or lymphadenopathy displacing vascular structures    and displacing sternocleidomastoid muscle and possibly involving some of the sternocleidomastoid muscle, as described above. 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 PLAN:       Dr Starr Dorado spoke with Dr. Jack Cao and Dr. Tito Hunter. Strongly encouraged obtaining large specimen of right cervical lymph node which can yield more tissue to see if this is primary sinus carcinoma/nasopharyngeal ca, obtain p16 testing and also to rule out possibility this could be lymphoma. Scheduled for or today. 1.  Further recommendations will depend on pathology.   2.  If path shows this is diffuse large B cell lymphoma, I would strongly recommend starting treatment here ASAP in the [facility], and once he recovers from his first cycle, he could return to [place]. 3.  If this is primary sinus cancer, then he should return home to [place] and start treatment there. He does not have a doctor in [place]. States his daughter will help him find a doctor.       Juana Ceron MD

## 2021-12-20 NOTE — PROGRESS NOTES
Back to OR.  [facility] and consent signed

## 2021-12-20 NOTE — PROGRESS NOTES
PACU Transfer to Floor Note    Current Allergies: Patient has no known allergies. Pt meets criteria as per Gladys Score and ASPAN Standards to transfer to next phase of care. No results for input(s): POCGLU in the last 72 hours. Vitals:    12/20/21 1615   BP: (!) 138/94   Pulse: 61   Resp: 10   Temp: 97 °F (36.1 °C)   SpO2: 95%     Vitals within 20% of pt's admission vitals as per GLADYS SCORE    SpO2: 95 %           Intake/Output Summary (Last 24 hours) at 12/20/2021 1634  Last data filed at 12/20/2021 1557  Gross per 24 hour   Intake 1300 ml   Output --   Net 1300 ml       Pain assessment:  0    Pain Level: 0      Handoff report given at bedside.    Family updated and directed to pt room      12/20/2021 4:34 PM

## 2021-12-20 NOTE — OP NOTE
Operative Note      Patient Name: Hussain Campbell  YOB: 1965  Medical Record Number:  [id number]  Billing Number:  [id number]  Date of Procedure: 12/20/2021  Time: 2173    Brief History: This is a 59 y/o male admitted for headaches and nausea with hematemesis was found to have a large neck mass on CT and a central skull base mass involving the clivus, sella and sphenoid sinus  Pre-operative Diagnosis: Right neck mass and sphenoid sinus mass    Post-operative Diagnosis: Same  Proc:  1..Right nasal endoscopy with sphenoidotomy  With biopsy of tumor ([id number])   2. Incisional biopsy right neck lymph node. Circulator: Yuval Way RN; Yosef Baugh RN  Scrub Person First: Dionisio Phipps  Circulator Assist: Carmen Cowan  Resident: Jeff Solano MD    Anesthesia: 1. 2 cc 1% lidocaine with 1:100,000 epinephrine injected in the neck incision planned site and 6cc into uncinate, septum and middle turbinate right  EBL: 25ml  Specimens: Right neck lymph node and right sphenoid sinus mass  Findings: Large matted right level 2-4 lymph nodes, large firm mass filing the sphenoid sinus  Complications: none  Antibiotics: 2g Ancef    After consent was confirmed the patient came into the operating room and was placed in supine position. General IV anesthesia was obtained and the patient intubated by Anesthesiology without difficulty. The table was turned and 2 cc's of 1% lidocaine with 1:100,000 epinephrine was injected into the planned incision site on the right neck at level 3 and uncinate, middle turbinate and anterior wall of the sphenoid sinus. The patient was prepped and draped in routine fashion. A 2.5 cm incision was made in the right neck crease at level2/3 border. The incision was carried through the platysma. The matted lymph nodes were identified. A 15 blade was utilized to perform an incisional biopsy for flow, frozen and permanent sections. Floseal was placed.  The floseal was irrigated free

## 2021-12-20 NOTE — BRIEF OP NOTE
Brief Postoperative Note      Patient: Erica Contreras  YOB: 1965  MRN: [id number]    Date of Procedure: 12/20/2021    Pre-Op Diagnosis: central skull mass with neck metastasis    Post-Op Diagnosis: carcinoma right neck       Procedure(s):  EXCISION BIOPSY RIGHT NECK MASS  NASAL ENDOSCOPY WITH REMOVAL TISSUE RIGHT SPHENOID SINUS    Surgeon(s):  Rachna Dove MD    Assistant:  Resident: Roxy Sandhu MD    Anesthesia: General    Estimated Blood Loss (mL): 25    Complications: None    Specimens:   ID Type Source Tests Collected by Time Destination   A : A. RIGHT NECK MASS (FOR LYMPH NODE PROTOCOL) Tissue Tissue SURGICAL PATHOLOGY Rachna Dove MD 12/20/2021 1504    B : B. RIGHT NECK MASS  Tissue Tissue SURGICAL PATHOLOGY Rachna Dove MD 12/20/2021 1508    C : C. RIGHT NECK MASS Tissue Tissue SURGICAL PATHOLOGY Rachna Dove MD 12/20/2021 1509    D : D. RIGHT SPHENOID SINUS MASS  Tissue Tissue SURGICAL PATHOLOGY Rachna Dove MD 12/20/2021 1527        Implants:  * No implants in log *      Drains: * No LDAs found *    Findings: Tumor in the right neck and sphenoid sinus.  Firm    Electronically signed by Yifan York MD on 12/20/2021 at 3:50 PM

## 2021-12-20 NOTE — ANESTHESIA PRE PROCEDURE
Department of Anesthesiology  Preprocedure Note       Name:  Juan Alberto Nunez   Age:  60 y.o.  :  1965                                          MRN:  [id number]         Date:  2021      Surgeon: Hernán Blue):  Rayna Cerrato MD    Procedure: Procedure(s):  EXCISION BIOPSY RIGHT NECK MASS  NASAL ENDOSCOPY WITH REMOVAL TISSUE SPHENOID SINUS    Medications prior to admission:   Prior to Admission medications    Not on File       Current medications:    Current Facility-Administered Medications   Medication Dose Route Frequency Provider Last Rate Last Admin    HYDROmorphone (DILAUDID) injection 0.25 mg  0.25 mg IntraVENous Q5 Min PRN Kylah Garnett MD        HYDROmorphone (DILAUDID) injection 0.5 mg  0.5 mg IntraVENous Q5 Min PRN Kylah Garnett MD        morphine injection 1 mg  1 mg IntraVENous Q5 Min PRN Kylah Garnett MD        HYDROmorphone (DILAUDID) injection 0.5 mg  0.5 mg IntraVENous Q5 Min PRN Kylah Garnett MD        oxyCODONE (ROXICODONE) immediate release tablet 5 mg  5 mg Oral PRN Kylah Garnett MD        Or    oxyCODONE (ROXICODONE) immediate release tablet 10 mg  10 mg Oral PRN Kylah Garnett MD        diphenhydrAMINE (BENADRYL) injection 12.5 mg  12.5 mg IntraVENous Once PRN Kylah Garnett MD        metoclopramide (REGLAN) injection 10 mg  10 mg IntraVENous Once PRN Kylah Garnett MD        promethazine (PHENERGAN) injection 6.25 mg  6.25 mg IntraVENous Once PRN Kylah Garnett MD        labetalol (NORMODYNE;TRANDATE) injection 5 mg  5 mg IntraVENous Q10 Min PRN Kylah Garnett MD        hydrALAZINE (APRESOLINE) injection 5 mg  5 mg IntraVENous Q10 Min PRN Kylah Garnett MD        meperidine (DEMEROL) injection 12.5 mg  12.5 mg IntraVENous Q5 Min PRN Kylah Garnett MD        oxymetazoline (AFRIN) 0.05 % nasal spray 2 spray  2 spray Each Nostril BID PRN Idalia Marlow MD   2 spray at 12/20/21 1003    ceFAZolin (ANCEF) 2000 mg in dextrose 5 % 50 mL IVPB  2,000 mg IntraVENous On Call to 95 Sylwia Fischer MD        melatonin disintegrating tablet 5 mg  5 mg Oral Nightly Amado Marcial MD   5 mg at 12/19/21 2240    morphine (PF) injection 2 mg  2 mg IntraVENous Q4H PRN Amado Marcial MD   2 mg at 12/20/21 0345    diphenhydrAMINE (BENADRYL) tablet 25 mg  25 mg Oral Q4H PRN Amado Marcial MD   25 mg at 12/20/21 0344    promethazine (PHENERGAN) injection 25 mg  25 mg IntraVENous Q4H PRN Amado Marcial MD   25 mg at 12/17/21 0211    pantoprazole (PROTONIX) injection 40 mg  40 mg IntraVENous Daily Yasmani Ridley MD   40 mg at 12/20/21 0912    sodium chloride flush 0.9 % injection 10 mL  10 mL IntraVENous 2 times per day Amado Marcial MD   10 mL at 12/20/21 0912    sodium chloride flush 0.9 % injection 10 mL  10 mL IntraVENous PRN Amado Marcial MD        0.9 % sodium chloride infusion  25 mL IntraVENous PRN Amado Marcial MD        ondansetron [facility]) injection 4 mg  4 mg IntraVENous Q4H PRN Amado Marcial MD        polyethylene glycol [facility]) packet 17 g  17 g Oral Daily PRN Amado Marcial MD        acetaminophen (TYLENOL) tablet 650 mg  650 mg Oral Q4H PRN Amado Marcial MD   650 mg at 12/20/21 1156    Or    acetaminophen (TYLENOL) suppository 650 mg  650 mg Rectal Q4H PRN Amado Marcial MD        0.9 % sodium chloride infusion   IntraVENous Continuous Amado Marcial MD 75 mL/hr at 12/20/21 0350 New Bag at 12/20/21 0350       Allergies:  No Known Allergies    Problem List:    Patient Active Problem List   Diagnosis Code    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 Past Surgical History:  No past surgical history on file.     Social History:    Social History     Tobacco Use    Smoking status: Former Smoker    Smokeless tobacco: Never Used   Substance Use Topics    Alcohol use: Not Currently                                Counseling given: Not Answered      Vital Signs reviewed no history of anesthetic complications:   Airway: Mallampati: II  TM distance: >3 FB   Neck ROM: full  Mouth opening: > = 3 FB Dental:          Pulmonary:Negative Pulmonary ROS                              Cardiovascular:Negative CV ROS                      Neuro/Psych:                ROS comment: Pituitary Mass GI/Hepatic/Renal: Neg GI/Hepatic/Renal ROS            Endo/Other: Negative Endo/Other ROS                    Abdominal:             Vascular: Other Findings:             Anesthesia Plan      general     ASA 3    (57-year-old male presents for EXCISION BIOPSY RIGHT NECK MASS AND NASAL ENDOSCOPY WITH REMOVAL TISSUE SPHENOID SINUS. Plan general anesthesia with ASA standard monitors. Questions answered. Patient agreeable with anesthetic plan.  )  Induction: intravenous. Anesthetic plan and risks discussed with patient. Plan discussed with CRNA.     Attending anesthesiologist reviewed and agrees with Osman Meyers MD   12/20/2021

## 2021-12-20 NOTE — PROGRESS NOTES
Neurosurgery Progress Note    Patient seen and examined on 12/20/21. No acute events overnight. Neurologically intact on exam. MRI with large mass in sphenoid sinus with invasion of cavernous sinus on left and bony destruction. CT chest/abd/pelvis with numerous nodules/lymphadenopathy of unclear etiology. A/P: 61 yo smoker with headache. Imaging with large enhancing mass in sphenoid/ethmoid sinuses with cavernous sinus and sellar extension.    -Neuro stable  -Q4H neuro checks  -HOB elevated  -Pain control with PO meds  -Pituitary labs WNL  -ENT following. Excisional biopsy right neck mass for flow and pathology and Nasal endoscopy with removal of tissue sphenoid sinus today  -GI following for stomach ulcer. On PPI. Will hold on steroids for now. -Diet: advance as tolerated after procedure today  -DVT ppx  -Will follow    DISPO: Remain inpatient    Patient was discussed with Dr. Franklyn Gibbs who agrees with assessment and plan.     Electronically signed by MARIA ALEJANDRA Saul CNP on 12/20/2021 at 2:06 PM  [phone]

## 2021-12-20 NOTE — PROGRESS NOTES
Surgery Daily Progress Note      CC: right neck mass    SUBJECTIVE:  Pt doing well this morning. No complaints. ROS:   A 14 point review of systems was conducted, significant findings as noted above. All other systems negative. OBJECTIVE:    PHYSICAL EXAM:  Vitals:    12/19/21 1829 12/19/21 1914 12/19/21 2243 12/20/21 0330   BP: 110/74 113/78 134/82 130/83   Pulse: 71 72 71 67   Resp: 18 18 18 18   Temp: 98.2 °F (36.8 °C) 98.7 °F (37.1 °C) 98.3 °F (36.8 °C) 98.1 °F (36.7 °C)   TempSrc: Oral Oral Oral Oral   SpO2: 96% 97% 97% 97%       GENERAL: No Acute Distress, Alert and Oriented  EYES: PERRLA, EOMI, Anti-icteric  NOSE: No epistaxis, nasal mucosa within normal limits, no purulent drainage  EARS: Normal external canal appearance, EAC patent bilaterally, TMs bilaterally within normal limits with normal light reflex  FACE: 1/6 House-Brackmann Scale, symmetric, sensation equal bilaterally  ORAL CAVITY: No masses or lesions palpated, uvula is midline, moist mucous membranes  NECK: Normal range of motion, no thyromegaly, trachea is midline, Large right level 2/3  lymphadenopathy, no crepitus  CHEST: Normal respiratory effort, no retractions      ASSESSMENT & PLAN:   Edi Goel is a 60 y.o. male with a central skull mass with neck metastasis.      - plan for OR today  - patient to remain NPO with IVF hydration    Olive Lopez MD, PGY-1  12/20/21 6:31 AM  Pager: [phone]

## 2021-12-20 NOTE — CARE COORDINATION
Patient is from home in [place] and was here for work. He is NPO for biopsy today and pending the results may stay here for initial treatment or may return to home in [place] to follow up with an MD there.      Electronically signed by Juan Robbins RN on 12/20/2021 at 11:16 AM  [phone]

## 2021-12-20 NOTE — PROGRESS NOTES
Pt is A&O, VSS. Pt NPO for biopsy this afternoon. C/o headache, PRN tylenol given. Resting in bed at this time with cold compress on face. Denies n/v. All fall precautions in place. Call light within reach.

## 2021-12-20 NOTE — PROGRESS NOTES
Pt transferred pre surgery from [address] [place] [address] to PACU 13. Connected to monitors. VSS. Alert and oriented. Armband on. Pt has no allergies. IV site occluded and removed.  Will plan to start another

## 2021-12-20 NOTE — PLAN OF CARE
Problem: Falls - Risk of:  Goal: Will remain free from falls  Description: Will remain free from falls  12/20/2021 1031 by Lili Bains RN  Outcome: Ongoing  Fall precautions in place. Bed is in lowest position, wheels locked, bed alarm on, non skid socks on. Call light and bedside table within reach. Pt calls out appropriately. Pt is up x1 SBA. Will continue to assess and monitor. Problem: Pain:  Goal: Pain level will decrease  Description: Pain level will decrease  12/20/2021 1031 by Lili Bains RN  Outcome: Ongoing  Pt c/o pain in head and neck. Pain medication offered, but pt denied at this time. Heat pack placed on neck for comfort.

## 2021-12-20 NOTE — PROGRESS NOTES
firm neck mass about 6 cm in diameter  Respiratory:  Normal respiratory effort. Clear to auscultation, bilaterally without Rales/Wheezes/Rhonchi. Cardiovascular: Regular rate and rhythm with normal S1/S2 without murmurs, rubs or gallops. Abdomen: Soft, non-tender, non-distended with normal bowel sounds. Musculoskeletal: No clubbing, cyanosis or edema bilaterally. Full range of motion without deformity. Skin: Skin color, texture, turgor normal.  No rashes or lesions. Neurologic:  Neurovascularly intact without any focal sensory/motor deficits. Cranial nerves: II-XII intact, grossly non-focal.  Psychiatric: Alert and oriented, thought content appropriate, normal insight  Capillary Refill: Brisk,3 seconds, normal   Peripheral Pulses: +2 palpable, equal bilaterally       Labs:   Recent Labs     12/18/21  0649 12/19/21  0827 12/20/21  0857   WBC 9.7 10.9 11.6*   HGB 13.8 13.7 12.9*   HCT 41.6 41.1 38.5*    405 397     Recent Labs     12/18/21  0649 12/19/21  0827 12/20/21  0855   * 135* 134*   K 4.3 4.6 4.8   CL 97* 99 98*   CO2 24 27 30   BUN 20 17 18   CREATININE 0.8* 0.9 0.9   CALCIUM 9.1 9.2 8.9     No results for input(s): AST, ALT, BILIDIR, BILITOT, ALKPHOS in the last 72 hours. No results for input(s): INR in the last 72 hours. No results for input(s): Kathia Dross in the last 72 hours. Urinalysis:    No results found for: Allegra Stevie, BACTERIA, RBCUA, BLOODU, [address], [place] [address]    Radiology:  CT SOFT TISSUE NECK W CONTRAST   Final Result      Extensive necrotic in hypervascular enhancing masses or lymphadenopathy in the right neck extending all the way from the  space to the supraclavicular region with extensive enhancing solid mass or lymphadenopathy displacing vascular structures    and displacing sternocleidomastoid muscle and possibly involving some of the sternocleidomastoid muscle, as described above.  This could be head neck cancer or metastatic disease      Lytic bony destruction affecting the clivus and skull base with soft tissue mass in the posterior nasopharynx and clivus which may be metastatic disease      CTA HEAD W WO CONTRAST   Final Result      1. Normal-appearing intracranial vasculature with no encasement of the middle cerebral or anterior cerebral arterial structures with slight displacement mainly of the left cavernous carotid by the sinus and clivus mass in the skull base        2. Unilateral left ethmoid air cell mucosal thickening, expansion of the sphenoid sinus and clivus with a soft tissue mass measuring up to 4.1 x 2.9 x 3.2 cm. The mass extends anteriorly to the posterior aspect of the nasal cavity posteriorly breaching    the cortex of the clivus at the prepontine cistern without signs of any dural based extension. The posterior margin the clivus is breached. The mass destroys portions of the clivus and there is erosion of the floor of the pituitary fossa and erosion of    the posterior nasal sinus walls and bilateral cavernous sinus walls. Differential considerations would include sinonasal adenocarcinoma, undifferentiated carcinoma or lymphoma or less likely metastasis      CT SINUS FOR IMAGE GUIDANCE   Final Result      1. Normal-appearing intracranial vasculature with no encasement of the middle cerebral or anterior cerebral arterial structures with slight displacement mainly of the left cavernous carotid by the sinus and clivus mass in the skull base        2. Unilateral left ethmoid air cell mucosal thickening, expansion of the sphenoid sinus and clivus with a soft tissue mass measuring up to 4.1 x 2.9 x 3.2 cm. The mass extends anteriorly to the posterior aspect of the nasal cavity posteriorly breaching    the cortex of the clivus at the prepontine cistern without signs of any dural based extension. The posterior margin the clivus is breached.  The mass destroys portions of the clivus and there is erosion of the floor of the pituitary fossa and erosion of    the posterior nasal sinus walls and bilateral cavernous sinus walls. Differential considerations would include sinonasal adenocarcinoma, undifferentiated carcinoma or lymphoma or less likely metastasis      MRI BRAIN W WO CONTRAST   Final Result      Enhancing neoplasm with bone destruction 3.7 x 3.2 x 2.7 cm centered at the sphenoid sinus and clivus with left cavernous sinus invasion and possible right cavernous sinus invasion extending anterior posterior from posterior aspect of nasal cavity to the    prepontine cistern causing destruction of floor of sella and mild mass effect on the pituitary gland. 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 Solitary lesion    makes bone metastasis less likely. The intermediate T2 characteristics are not typical for chordoma or chondrosarcoma.                  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  -MRI showing enhancing neoplasm with bone destruction 3.7 x 3.2 x 2.7 cm centered at the sphenoid sinus and clivus with left cavernous sinus invasion and possible right cavernous sinus invasion extending anterior posterior from posterior aspect of nasal cavity to the    prepontine cistern causing destruction of floor of sella and mild mass effect on the pituitary gland  -CT soft tissue of the neck showing Extensive necrotic in hypervascular enhancing masses or lymphadenopathy in the right neck extending all the way from the  space to the supraclavicular region with extensive enhancing solid mass or lymphadenopathy displacing vascular structures   and displacing sternocleidomastoid muscle and possibly involving some of the sternocleidomastoid muscle  -ENT, neurosurgery and oncology has been consulted  -Plan for possible transnasal biopsy today    Hematemesis not thought related to ulcer secondary to NSAID use  -GI following  -Continue PPI twice daily  -No plans for intervention at this time    Hyponatremia  -Continue to monitor    Diet: Diet NPO  Code Status: Full Code    Dispo -possible biopsy today further disposition pending afterwards    Zoran Varner MD

## 2021-12-20 NOTE — PLAN OF CARE
Problem: Falls - Risk of:  Goal: Will remain free from falls  12/19/2021 2155 by Latricia Castro RN  Outcome: Ongoing   Pt remains free from injury during this shift. Pt is a stand by assist x 1, gait belt. Encourage pt to call for all assistance. Call light is in reach, bed alarm is activated for safety, bed locked and in lowest position. Will continue to monitor. Problem: Pain:  Goal: Pain level will decrease  12/19/2021 2155 by Latricia Castro RN  Outcome: Ongoing   No c/o pain at this time.  Encourage pt to call

## 2021-12-20 NOTE — BRIEF OP NOTE
Brief Postoperative Note      Patient: Anjana Alfaro  YOB: 1965  MRN: [id number]    Date of Procedure: 12/20/2021    Pre-Op Diagnosis: central skull mass with neck metastasis    Post-Op Diagnosis: Same       Procedure(s):  EXCISION BIOPSY RIGHT NECK MASS  NASAL ENDOSCOPY WITH REMOVAL TISSUE RIGHT SPHENOID SINUS    Surgeon(s):  Rolando Riggins MD    Assistant:  Resident: Miguel Call MD    Anesthesia: General    Estimated Blood Loss (mL): Minimal    Complications: None    Specimens:   ID Type Source Tests Collected by Time Destination   A : A.  RIGHT NECK MASS (FOR LYMPH NODE PROTOCOL) Tissue Tissue SURGICAL PATHOLOGY Rolando Riggins MD 12/20/2021 1504    B : B. RIGHT NECK MASS  Tissue Tissue SURGICAL PATHOLOGY Rolando Riggins MD 12/20/2021 1508    C : C. RIGHT NECK MASS Tissue Tissue SURGICAL PATHOLOGY Rolando Riggins MD 12/20/2021 1509    D : D. RIGHT SPHENOID SINUS MASS  Tissue Tissue SURGICAL PATHOLOGY Rolando Riggins MD 12/20/2021 1527        Implants:  * No implants in log *      Drains: * No LDAs found *    Findings: excisional biopsy of right neck mass; nasal endoscopy and biopsy of sphenoid sinus mass     Electronically signed by Renetta Pineda MD on 12/20/2021 at 3:50 PM

## 2021-12-21 LAB
ANION GAP SERPL CALCULATED.3IONS-SCNC: 7 MMOL/L (ref 3–16)
ANISOCYTOSIS: ABNORMAL
BASOPHILS ABSOLUTE: 0 K/UL (ref 0–0.2)
BASOPHILS RELATIVE PERCENT: 0 %
BUN BLDV-MCNC: 28 MG/DL (ref 7–20)
CALCIUM SERPL-MCNC: 9.4 MG/DL (ref 8.3–10.6)
CHLORIDE BLD-SCNC: 99 MMOL/L (ref 99–110)
CO2: 28 MMOL/L (ref 21–32)
CREAT SERPL-MCNC: 1 MG/DL (ref 0.9–1.3)
EOSINOPHILS ABSOLUTE: 0 K/UL (ref 0–0.6)
EOSINOPHILS RELATIVE PERCENT: 0 %
GFR AFRICAN AMERICAN: >60
GFR NON-AFRICAN AMERICAN: >60
GLUCOSE BLD-MCNC: 126 MG/DL (ref 70–99)
HCT VFR BLD CALC: 36.1 % (ref 40.5–52.5)
HEMOGLOBIN: 11.8 G/DL (ref 13.5–17.5)
LYMPHOCYTES ABSOLUTE: 1.2 K/UL (ref 1–5.1)
LYMPHOCYTES RELATIVE PERCENT: 8 %
MCH RBC QN AUTO: 26.2 PG (ref 26–34)
MCHC RBC AUTO-ENTMCNC: 32.6 G/DL (ref 31–36)
MCV RBC AUTO: 80.4 FL (ref 80–100)
MONOCYTES ABSOLUTE: 1.5 K/UL (ref 0–1.3)
MONOCYTES RELATIVE PERCENT: 10 %
NEUTROPHILS ABSOLUTE: 12.4 K/UL (ref 1.7–7.7)
NEUTROPHILS RELATIVE PERCENT: 82 %
PDW BLD-RTO: 18.4 % (ref 12.4–15.4)
PLATELET # BLD: 391 K/UL (ref 135–450)
PMV BLD AUTO: 8.1 FL (ref 5–10.5)
POTASSIUM REFLEX MAGNESIUM: 4.6 MMOL/L (ref 3.5–5.1)
RBC # BLD: 4.49 M/UL (ref 4.2–5.9)
SODIUM BLD-SCNC: 134 MMOL/L (ref 136–145)
WBC # BLD: 15.1 K/UL (ref 4–11)

## 2021-12-21 PROCEDURE — C9113 INJ PANTOPRAZOLE SODIUM, VIA: HCPCS | Performed by: STUDENT IN AN ORGANIZED HEALTH CARE EDUCATION/TRAINING PROGRAM

## 2021-12-21 PROCEDURE — 2060000000 HC ICU INTERMEDIATE R&B

## 2021-12-21 PROCEDURE — 85025 COMPLETE CBC W/AUTO DIFF WBC: CPT

## 2021-12-21 PROCEDURE — 80048 BASIC METABOLIC PNL TOTAL CA: CPT

## 2021-12-21 PROCEDURE — 6360000002 HC RX W HCPCS: Performed by: STUDENT IN AN ORGANIZED HEALTH CARE EDUCATION/TRAINING PROGRAM

## 2021-12-21 PROCEDURE — 6370000000 HC RX 637 (ALT 250 FOR IP): Performed by: INTERNAL MEDICINE

## 2021-12-21 PROCEDURE — 2580000003 HC RX 258: Performed by: INTERNAL MEDICINE

## 2021-12-21 PROCEDURE — 36415 COLL VENOUS BLD VENIPUNCTURE: CPT

## 2021-12-21 RX ADMIN — DIPHENHYDRAMINE HYDROCHLORIDE 25 MG: 25 TABLET ORAL at 19:28

## 2021-12-21 RX ADMIN — SODIUM CHLORIDE, PRESERVATIVE FREE 10 ML: 5 INJECTION INTRAVENOUS at 08:14

## 2021-12-21 RX ADMIN — ACETAMINOPHEN 650 MG: 325 TABLET ORAL at 09:17

## 2021-12-21 RX ADMIN — ACETAMINOPHEN 650 MG: 325 TABLET ORAL at 19:28

## 2021-12-21 RX ADMIN — PANTOPRAZOLE SODIUM 40 MG: 40 INJECTION, POWDER, FOR SOLUTION INTRAVENOUS at 08:14

## 2021-12-21 RX ADMIN — ACETAMINOPHEN 650 MG: 325 TABLET ORAL at 13:52

## 2021-12-21 RX ADMIN — SODIUM CHLORIDE, PRESERVATIVE FREE 10 ML: 5 INJECTION INTRAVENOUS at 19:29

## 2021-12-21 RX ADMIN — DIPHENHYDRAMINE HYDROCHLORIDE 25 MG: 25 TABLET ORAL at 09:17

## 2021-12-21 ASSESSMENT — PAIN DESCRIPTION - FREQUENCY
FREQUENCY: INTERMITTENT
FREQUENCY: CONTINUOUS
FREQUENCY: INTERMITTENT

## 2021-12-21 ASSESSMENT — PAIN DESCRIPTION - PAIN TYPE
TYPE: ACUTE PAIN

## 2021-12-21 ASSESSMENT — PAIN DESCRIPTION - PROGRESSION
CLINICAL_PROGRESSION: RESOLVED
CLINICAL_PROGRESSION: GRADUALLY WORSENING
CLINICAL_PROGRESSION: NOT CHANGED
CLINICAL_PROGRESSION: GRADUALLY WORSENING
CLINICAL_PROGRESSION: RESOLVED

## 2021-12-21 ASSESSMENT — PAIN DESCRIPTION - LOCATION
LOCATION: HEAD

## 2021-12-21 ASSESSMENT — PAIN DESCRIPTION - DESCRIPTORS
DESCRIPTORS: HEADACHE
DESCRIPTORS: ACHING

## 2021-12-21 ASSESSMENT — PAIN DESCRIPTION - ONSET
ONSET: ON-GOING
ONSET: GRADUAL
ONSET: ON-GOING

## 2021-12-21 ASSESSMENT — PAIN - FUNCTIONAL ASSESSMENT
PAIN_FUNCTIONAL_ASSESSMENT: ACTIVITIES ARE NOT PREVENTED

## 2021-12-21 ASSESSMENT — PAIN SCALES - GENERAL
PAINLEVEL_OUTOF10: 0
PAINLEVEL_OUTOF10: 0
PAINLEVEL_OUTOF10: 3
PAINLEVEL_OUTOF10: 5
PAINLEVEL_OUTOF10: 4
PAINLEVEL_OUTOF10: 0
PAINLEVEL_OUTOF10: 0

## 2021-12-21 ASSESSMENT — PAIN DESCRIPTION - ORIENTATION: ORIENTATION: MID

## 2021-12-21 NOTE — PROGRESS NOTES
Surgery Daily Progress Note  David Woodn  CC:  Pituatary mass      Subjective :No acute events overnight. Patient states pain is well controlled. Denies any difficulty swallowing, denies any SOB, tolerating diet      Objective    Infusions:   sodium chloride      sodium chloride Stopped (12/20/21 1507)        I/O:I/O last 3 completed shifts: In: 0934 [P.O.:240; I.V.:1300]  Out: -            Wt Readings from Last 1 Encounters:   12/15/21 200 lb (90.7 kg)                 LABS:    Recent Labs     12/19/21  0827 12/20/21  0857   WBC 10.9 11.6*   HGB 13.7 12.9*   HCT 41.1 38.5*   MCV 80.1 80.1    397        Recent Labs     12/19/21  0827 12/20/21  0855   * 134*   K 4.6 4.8   CL 99 98*   CO2 27 30   BUN 17 18   CREATININE 0.9 0.9      No results for input(s): AST, ALT, ALB, BILIDIR, BILITOT, ALKPHOS in the last 72 hours. No results for input(s): LIPASE, AMYLASE in the last 72 hours. No results for input(s): PROT, INR, APTT in the last 72 hours. No results for input(s): CKTOTAL, CKMB, CKMBINDEX, TROPONINI in the last 72 hours. Exam:/84   Pulse 72   Temp 98.6 °F (37 °C) (Oral)   Resp 16   SpO2 95%   General appearance: alert, appears stated age and cooperative  Eyes: No scleral icterus, left eye lid droop compared to the right  Neck: trachea midline, no JVD, no lymphadenopathy, neck supple, right neck with marked swelling that is soft, no erythema or induration, no drainage, incision is well approximated, c/di  Chest/Lungs: Normal effort with no accessory muscle use on RA  Cardiovascular: RRR, extremities well perfused  Abdomen: Soft, non-tender, non-distended, no rebound, guarding, or rigidity.   Skin: warm and dry, no rashes  Extremities: no edema, no cyanosis  Genitourinary: Grossly normal  Neuro: A&Ox3, no focal deficits, sensation intact    ASSESSMENT/PLAN: Pt. is a 60 y.o. 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     - follow up bx  - OK to place ice on incision for swelling, will continue to monitor  - further management per primary team      [facility], APRN - CNP 12/21/2021 6:39 AM  [phone]

## 2021-12-21 NOTE — PROGRESS NOTES
ONCOLOGY HEMATOLOGY CARE  PROGRESS NOTE    SUBJECTIVE:       POD #1 from biopsy of right cervical lymphadenopathy and sphenoid sinus mass. Pain controlled      PHYSICAL EXAM:       /84   Pulse 72   Temp 98.6 °F (37 °C) (Oral)   Resp 16   SpO2 95%       General appearance: Sleepy this am.  Appears older than stated age  [de-identified]: Normocephalic. Left eyelid droop, unable to move left eye laterally. No scleral icterus. Normal mucus membranes. Normal mouth exam  Neck: Large conglomerate very firm irregular matted right cervical adenopathy, about 6 x 7 cm in diameter  Lymph Nodes: No palpable lymph nodes in the left cervical supraclavicular, axillary areas. Lungs: Clear to auscultation. No wheezes, rales or rhonchi are heard. Heart: Regular rate and rhythm, S1, S2 normal  Abdomen: Soft, non-tender; bowel sounds normal; no masses, distention, ascites. Liver and spleen are not palpable. Extremities: without cyanosis, clubbing, edema or asymmetry  Skin: No jaundice, purpura or petechiae  Neuro:  See cranial nerve ENT description. Alert and oriented. Speech is normal.  Mentation is normal.  Patient is able to carry a complex conversation regarding their illness.   Musculoskeletal: No tenderness or swelling over any bones or joints noted  Psychiatric: depressed affect       MEDS:     Current Facility-Administered Medications   Medication Dose Route Frequency Provider Last Rate Last Admin    meperidine (DEMEROL) injection 12.5 mg  12.5 mg IntraVENous Q5 Min PRN Blease MD Shanna        HYDROmorphone (DILAUDID) injection 0.25 mg  0.25 mg IntraVENous Q5 Min PRN Blewilber Otero MD        HYDROmorphone (DILAUDID) injection 0.5 mg  0.5 mg IntraVENous Q5 Min PRN Blewilber Otero MD        morphine injection 1 mg  1 mg IntraVENous Q5 Min PRN Blease MD Shanna        HYDROmorphone (DILAUDID) injection 0.5 mg  0.5 mg IntraVENous Q5 Min PRN Blewilber Otero MD        labetalol (NORMODYNE;TRANDATE) injection 5 NEUTOPHILPCT 71.9 12/20/2021    MONOPCT 11.8 12/20/2021    BASOPCT 0.7 12/20/2021    NEUTROABS 8.4 (H) 12/20/2021    LYMPHSABS 1.8 12/20/2021    MONOSABS 1.4 (H) 12/20/2021    EOSABS 0.0 12/20/2021    BASOSABS 0.1 12/20/2021       CMP:   Lab Results   Component Value Date     12/20/2021    K 4.8 12/20/2021    CL 98 12/20/2021    CO2 30 12/20/2021    BUN 18 12/20/2021    CREATININE 0.9 12/20/2021    GLUCOSE 103 12/20/2021    CALCIUM 8.9 12/20/2021    PROT 7.5 12/15/2021    LABALBU 4.2 12/15/2021    BILITOT 0.2 12/15/2021    ALKPHOS 72 12/15/2021    AST 22 12/15/2021    ALT 14 12/15/2021            IMAGING:     CT neck 12/18/2021  FINDINGS:       Nasopharynx: Upper portions Opacified with fluid and along the posterior nasopharynx there is a large mass eroding or occupying the clivus and medial aspect of the skull base anteriorly with soft tissue mass displacing the cavernous carotid artery on the    left as previously described on sinus CT reports with cortical bone destruction and expansion.        The masses previously seen measures at least 3.7 x 3.2 cm as measured on axial image 36. . No sign of any obstruction of the nasopharynx       Suprahyoid neck: Normal oropharynx, oral cavity, parapharyngeal space, and retropharyngeal space.       Lymph nodes:       There is massive soft tissue swelling beginning in the inferior  space with diffuse low-attenuation hypervascular enhancing lobular masses occupying the right neck from the angle of the mandible down all the way to the level of the    supraclavicular region either representing primary head and neck cancer or diffuse lymphadenopathy. This causes mild displacement of the right parapharyngeal region externally and displacement of the carotid space medially. . Overall enlargement of lymph    nodes at level 2 level 3 and level 4 with level 2 lymph nodes displacing the carotid space and measuring up to 6.9 x 5.2 cm on axial image 113.  Overall extension on the coronal images is 13 cm in length of diffuse lymphadenopathy. There appears to be    some hyperdensity and calcification along the inferior masses in the lower neck and encasement of the sternocleidomastoid medial margins. The mass extends or manuel disease extends along the right thyroid displacing it slightly and at the level of the    thyroid there is soft tissue mass or lymphadenopathy measuring 4.2 x 4.1 cm as seen on axial image 196.       Infrahyoid neck: Normal larynx, hypopharynx, and supraglottis. The thyroid appears displaced on the right side otherwise normal.                Vascular structures: Displaced on the right side the carotid space and vessels medially due to the large manuel disease or masses       Thoracic inlet: The visualized portions of the lungs are free from segmental consolidation however small nodular changes are noted in the right upper lobe which could be metastatic disease. There are also some small nodules left upper lobe as seen on    image 240.        Bones: Demineralized bony structures and advanced cervical spondylosis appreciated. Lytic destruction noted in the clivus as previously described           Impression       Extensive necrotic in hypervascular enhancing masses or lymphadenopathy in the right neck extending all the way from the  space to the supraclavicular region with extensive enhancing solid mass or lymphadenopathy displacing vascular structures    and displacing sternocleidomastoid muscle and possibly involving some of the sternocleidomastoid muscle, as described above.  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      PLAN:       Clinical and radiographic picture of a neglected malignancy. S/P excisional biopsy of right cervical lymph node and sphenoid sinus mass to see if this is primary sinus carcinoma/nasopharyngeal ca, obtain p16 testing and also to rule out possibility this could be lymphoma. Path pending. 1.  Further recommendations will depend on pathology. 2.  If path shows this is diffuse large B cell lymphoma, I would strongly recommend starting treatment here ASAP in [address] [facility], and once he recovers from his first cycle, he could return to [place]. 3.  If this is primary sinus cancer, then he should return home to [place] and start treatment there. He does not have a doctor in [place]. States his daughter will help him find a doctor. States he has booked a flight to [place] on Friday morning.     Rhiannon Patino MD

## 2021-12-21 NOTE — PROGRESS NOTES
Hospitalist Progress Note      PCP: No primary care provider on file. Date of Admission: 12/16/2021    Chief Complaint: Headache    Hospital Course:  Patient is 49-year-old gentleman with history of tobacco abuse came into ER with a complaint of headache, abdominal pain, hematemesis for 2 weeks. 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 CT abdomen pelvis showed 2.2 cm conglomerate density right lung mass, right neck adenopathy, irregularity in gastric antrum suspicious for ulcer  CT neck soft tissue extensive necrotic and hypervascular enhancing mass or lymphadenopathy in the right neck. S/P EXCISION BIOPSY RIGHT NECK MASS  NASAL ENDOSCOPY WITH REMOVAL TISSUE RIGHT SPHENOID SINUS on 12/20    Subjective  Some pain at incisional site. Denies any shortness of breath nausea, vomiting, epistaxis. He wanted to go back to [place] for [place] and is a flight on Friday. Medications:  Reviewed    Infusion Medications    sodium chloride      sodium chloride Stopped (12/20/21 1507)     Scheduled Medications    melatonin  5 mg Oral Nightly    pantoprazole  40 mg IntraVENous Daily    sodium chloride flush  10 mL IntraVENous 2 times per day     PRN Meds: meperidine, HYDROmorphone, HYDROmorphone, morphine, HYDROmorphone, labetalol, hydrALAZINE, oxymetazoline, morphine, diphenhydrAMINE, promethazine, sodium chloride flush, sodium chloride, ondansetron, polyethylene glycol, acetaminophen **OR** acetaminophen      Intake/Output Summary (Last 24 hours) at 12/21/2021 1242  Last data filed at 12/21/2021 1115  Gross per 24 hour   Intake 1780 ml   Output --   Net 1780 ml       Physical Exam Performed:    /77   Pulse 61   Temp 97.9 °F (36.6 °C) (Oral)   Resp 16   SpO2 95%     General appearance: NAD, appears stated age and cooperative.   HEENT: Drooping of left eyelid, , PERRLA  Neck: Supple, right-sided firm neck mass surgical site glued C/D/I  Respiratory:  Normal respiratory effort. Clear to auscultation, bilaterally without Rales/Wheezes/Rhonchi. Cardiovascular: Regular rate and rhythm with normal S1/S2 without murmurs, rubs or gallops. Abdomen: Soft, non-tender, non-distended with normal bowel sounds. Musculoskeletal: No clubbing, cyanosis or edema bilaterally. Full range of motion without deformity. Skin: Skin color, texture, turgor normal.  No rashes or lesions. Neurologic:  Neurovascularly intact without any focal sensory/motor deficits. Cranial nerves: II-XII intact, grossly non-focal.  Psychiatric: Alert and oriented, thought content appropriate, normal insight  Capillary Refill: Brisk,3 seconds, normal   Peripheral Pulses: +2 palpable, equal bilaterally       Labs:   Recent Labs     12/19/21  0827 12/20/21  0857 12/21/21  0611   WBC 10.9 11.6* 15.1*   HGB 13.7 12.9* 11.8*   HCT 41.1 38.5* 36.1*    397 391     Recent Labs     12/19/21  0827 12/20/21  0855 12/21/21  0611   * 134* 134*   K 4.6 4.8 4.6   CL 99 98* 99   CO2 27 30 28   BUN 17 18 28*   CREATININE 0.9 0.9 1.0   CALCIUM 9.2 8.9 9.4     No results for input(s): AST, ALT, BILIDIR, BILITOT, ALKPHOS in the last 72 hours. No results for input(s): INR in the last 72 hours. No results for input(s): Adonica Hoose in the last 72 hours. Urinalysis:    No results found for: Saralyn Patton, BACTERIA, RBCUA, BLOODU, [address], [place] [address]    Radiology:  CT SOFT TISSUE NECK W CONTRAST   Final Result      Extensive necrotic in hypervascular enhancing masses or lymphadenopathy in the right neck extending all the way from the  space to the supraclavicular region with extensive enhancing solid mass or lymphadenopathy displacing vascular structures    and displacing sternocleidomastoid muscle and possibly involving some of the sternocleidomastoid muscle, as described above.  This could be head neck cancer or metastatic disease      Lytic bony destruction affecting the clivus and skull base with soft tissue mass in the posterior nasopharynx and clivus which may be metastatic disease      CTA HEAD W WO CONTRAST   Final Result      1. Normal-appearing intracranial vasculature with no encasement of the middle cerebral or anterior cerebral arterial structures with slight displacement mainly of the left cavernous carotid by the sinus and clivus mass in the skull base        2. Unilateral left ethmoid air cell mucosal thickening, expansion of the sphenoid sinus and clivus with a soft tissue mass measuring up to 4.1 x 2.9 x 3.2 cm. The mass extends anteriorly to the posterior aspect of the nasal cavity posteriorly breaching    the cortex of the clivus at the prepontine cistern without signs of any dural based extension. The posterior margin the clivus is breached. The mass destroys portions of the clivus and there is erosion of the floor of the pituitary fossa and erosion of    the posterior nasal sinus walls and bilateral cavernous sinus walls. Differential considerations would include sinonasal adenocarcinoma, undifferentiated carcinoma or lymphoma or less likely metastasis      CT SINUS FOR IMAGE GUIDANCE   Final Result      1. Normal-appearing intracranial vasculature with no encasement of the middle cerebral or anterior cerebral arterial structures with slight displacement mainly of the left cavernous carotid by the sinus and clivus mass in the skull base        2. Unilateral left ethmoid air cell mucosal thickening, expansion of the sphenoid sinus and clivus with a soft tissue mass measuring up to 4.1 x 2.9 x 3.2 cm. The mass extends anteriorly to the posterior aspect of the nasal cavity posteriorly breaching    the cortex of the clivus at the prepontine cistern without signs of any dural based extension. The posterior margin the clivus is breached.  The mass destroys portions of the clivus and there is

## 2021-12-21 NOTE — PROGRESS NOTES
Physician Progress Note      Venice Knowles  CSN #:                  [id number]  :                       1965  ADMIT DATE:       2021 10:11 PM  [address] [place] DATE:  RESPONDING  PROVIDER #:        Tea Morales MD          QUERY TEXT:    Patient admitted with Skull base mass and hematemesis, noted to have \"Likely   NSAID induced gastric ulcer\" per GI c/s, and \"Hematemesis not thought related   to ulcer secondary to NSAID use\" Per Attending  . If possible, please   document in progress notes and discharge summary the cause of the :      The medical record reflects the following:  Risk Factors: NSAID use, gastric ulcer  Clinical Indicators: Per HPI \"hematemesis for 2 weeks\"; Per GI c/s    \"Abnormal imaging of the GI tract with questionable ulcer in the gastric   antrum; has not had any further bleeding and his hemoglobin is normal.  Likely   NSAID induced gastric ulcer. \" Per Attending  \"Hematemesis not thought   related to ulcer secondary to NSAID use\". CT ABD: submucosal edema in the   region of the pyloric antrum, with mild adjacent haziness as well as a small   focus of gas  which may represent air within a peptic ulcer  Treatment: GI c/s, PPI BID  Options provided:  -- Hematemesis due to gastric ulcer from NSAID use  -- Hematemesis due to other, Please specify. -- Other - I will add my own diagnosis  -- Disagree - Not applicable / Not valid  -- Disagree - Clinically unable to determine / Unknown  -- Refer to Clinical Documentation Reviewer    PROVIDER RESPONSE TEXT:    This patient has Hematemesis due to gastric ulcer from NSAID use. Query created by:  Saud Dinh on 2021 5:08 PM      Electronically signed by:  Tea Morales MD 2021 8:11 AM

## 2021-12-21 NOTE — PROGRESS NOTES
Patient alert and oriented x4. VSS. Surgical sites are CDI. Neuro checks WNL. Pain controlled, denies need for pain medicine. Tolerating ambulation well. Tolerating diet. Urinating adequately. Fall precautions in place.

## 2021-12-21 NOTE — PROGRESS NOTES
Neurosurgery Progress Note    Patient seen and examined on 12/21/21. No acute events overnight. Neurologically intact on exam. MRI with large mass in sphenoid sinus with invasion of cavernous sinus on left and bony destruction. CT chest/abd/pelvis with numerous nodules/lymphadenopathy of unclear etiology. A/P: 61 yo smoker with headache. Imaging with large enhancing mass in sphenoid/ethmoid sinuses with cavernous sinus and sellar extension.    -Neuro stable  -Q4H neuro checks  -HOB elevated  -Pain control with PO meds  -Pituitary labs WNL  -ENT following. Excisional biopsy right neck mass for flow and pathology and Nasal endoscopy with removal of tissue sphenoid sinus done yesterday- path pending  -Oncology following   -GI following for stomach ulcer. On PPI. Will hold on steroids for now. -Diet: per primary   -DVT ppx per primary, ok for SQ heparin from NSGY standpoint   -Will follow, call with questions. DISPO: Remain inpatient    Patient was discussed with Dr. Radha Loera who agrees with assessment and plan.     Electronically signed by MARIA ALEJANDRA Villegas NP on 12/21/2021 at 10:25 AM  [phone]

## 2021-12-21 NOTE — PROGRESS NOTES
Pt is A/O x4. VSS. Neuro checks remain unchanged. Up as SBA. Pt c/o intermittent headache, pain managed with tylenol. Fall precautions in place. Will continue to monitor.

## 2021-12-21 NOTE — PROGRESS NOTES
ENT Surgery  Post-operative Note      Procedure(s) Performed: Excisional biopsy of right neck mass, nasal endoscopy with removal of tissue in right sphenoid sinus. Subjective:   Patient's pain is controlled, denies nausea or vomiting. Tolerating diet. OOB, ambulating and voiding appropriately. Denies flatus or BM at this time. Objective:  Anesthesia type: General      I/O    Intra op    Post op     Fluids  300 mL 200 mL     EBL Minimal 0 mL     Urine 0 mL 200 mL     Vitals:   Vitals:    12/20/21 1610 12/20/21 1615 12/20/21 1733 12/20/21 1904   BP: (!) 145/109 (!) 138/94 (!) 154/91 (!) 157/102   Pulse: 59 61  72   Resp: 8 10 16 16   Temp:  97 °F (36.1 °C) 98.1 °F (36.7 °C) 98.3 °F (36.8 °C)   TempSrc:  Temporal Oral Oral   SpO2: 97% 95%         Physical Exam:  Post-op vital signs:  Stable   General appearance: alert, no acute distress, grooming appropriate  Eyes: No scleral icterus, left eye lid droop compared to the right  Neck: trachea midline, no JVD, no lymphadenopathy, neck supple, right neck with marked swelling that is soft, no erythema or induration, no drainage, incision is well approximated, c/di  Chest/Lungs: Normal effort with no accessory muscle use on RA  Cardiovascular: RRR, extremities well perfused  Abdomen: Soft, non-tender, non-distended, no rebound, guarding, or rigidity. Skin: warm and dry, no rashes  Extremities: no edema, no cyanosis  Genitourinary: Grossly normal  Neuro: A&Ox3, no focal deficits, sensation intact    Assessment and Plan  This is a 60y.o. 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 (12/20) POD0.     Pain management: Roxicodone pain panel, Dilaudid pain panel and PRN tylenol  Cardiovasc: hemodynamically stable, will continue to monitor  Respiratory:  IS ordered to bedside, encourage hourly IS and deep breathing, wean oxygen as tolerated  Fluids:

## 2021-12-21 NOTE — CARE COORDINATION
Patient has a flight scheduled for Friday to return to [place]. He has a friend coming in but may need transport to the airport at d/c.      Electronically signed by Adrianna Licona RN on 12/21/2021 at 11:57 AM  [phone]

## 2021-12-22 VITALS
TEMPERATURE: 98.5 F | RESPIRATION RATE: 18 BRPM | HEART RATE: 95 BPM | OXYGEN SATURATION: 96 % | DIASTOLIC BLOOD PRESSURE: 74 MMHG | SYSTOLIC BLOOD PRESSURE: 121 MMHG

## 2021-12-22 LAB
ANION GAP SERPL CALCULATED.3IONS-SCNC: 10 MMOL/L (ref 3–16)
BASOPHILS ABSOLUTE: 0.1 K/UL (ref 0–0.2)
BASOPHILS RELATIVE PERCENT: 0.9 %
BUN BLDV-MCNC: 18 MG/DL (ref 7–20)
CALCIUM SERPL-MCNC: 9.2 MG/DL (ref 8.3–10.6)
CHLORIDE BLD-SCNC: 95 MMOL/L (ref 99–110)
CO2: 28 MMOL/L (ref 21–32)
CREAT SERPL-MCNC: 0.9 MG/DL (ref 0.9–1.3)
EOSINOPHILS ABSOLUTE: 0.1 K/UL (ref 0–0.6)
EOSINOPHILS RELATIVE PERCENT: 0.5 %
GFR AFRICAN AMERICAN: >60
GFR NON-AFRICAN AMERICAN: >60
GLUCOSE BLD-MCNC: 111 MG/DL (ref 70–99)
HCT VFR BLD CALC: 36.7 % (ref 40.5–52.5)
HEMOGLOBIN: 12.1 G/DL (ref 13.5–17.5)
IGF-1 (INSULIN-LIKE GROWTH I): 195 NG/ML (ref 56–203)
INSULIN-LIKE GROWTH FACTOR-1 Z-SCORE: 1.6
LYMPHOCYTES ABSOLUTE: 1.8 K/UL (ref 1–5.1)
LYMPHOCYTES RELATIVE PERCENT: 15.3 %
MCH RBC QN AUTO: 26.3 PG (ref 26–34)
MCHC RBC AUTO-ENTMCNC: 33 G/DL (ref 31–36)
MCV RBC AUTO: 79.6 FL (ref 80–100)
MONOCYTES ABSOLUTE: 1 K/UL (ref 0–1.3)
MONOCYTES RELATIVE PERCENT: 8.3 %
NEUTROPHILS ABSOLUTE: 8.9 K/UL (ref 1.7–7.7)
NEUTROPHILS RELATIVE PERCENT: 75 %
PDW BLD-RTO: 18.3 % (ref 12.4–15.4)
PLATELET # BLD: 415 K/UL (ref 135–450)
PMV BLD AUTO: 8 FL (ref 5–10.5)
POTASSIUM REFLEX MAGNESIUM: 4.8 MMOL/L (ref 3.5–5.1)
RBC # BLD: 4.6 M/UL (ref 4.2–5.9)
SEX HORMONE BINDING GLOBULIN: 28 NMOL/L (ref 11–80)
SODIUM BLD-SCNC: 133 MMOL/L (ref 136–145)
TESTOSTERONE FREE-NONMALE: 3.9 PG/ML (ref 47–244)
TESTOSTERONE TOTAL: 20 NG/DL (ref 220–1000)
WBC # BLD: 11.9 K/UL (ref 4–11)

## 2021-12-22 PROCEDURE — 6370000000 HC RX 637 (ALT 250 FOR IP): Performed by: INTERNAL MEDICINE

## 2021-12-22 PROCEDURE — C9113 INJ PANTOPRAZOLE SODIUM, VIA: HCPCS | Performed by: STUDENT IN AN ORGANIZED HEALTH CARE EDUCATION/TRAINING PROGRAM

## 2021-12-22 PROCEDURE — 85025 COMPLETE CBC W/AUTO DIFF WBC: CPT

## 2021-12-22 PROCEDURE — 2580000003 HC RX 258: Performed by: INTERNAL MEDICINE

## 2021-12-22 PROCEDURE — 80048 BASIC METABOLIC PNL TOTAL CA: CPT

## 2021-12-22 PROCEDURE — 36415 COLL VENOUS BLD VENIPUNCTURE: CPT

## 2021-12-22 PROCEDURE — 6360000002 HC RX W HCPCS: Performed by: STUDENT IN AN ORGANIZED HEALTH CARE EDUCATION/TRAINING PROGRAM

## 2021-12-22 RX ORDER — PANTOPRAZOLE SODIUM 40 MG/1
40 TABLET, DELAYED RELEASE ORAL DAILY
Qty: 30 TABLET | Refills: 0 | Status: SHIPPED | OUTPATIENT
Start: 2021-12-22

## 2021-12-22 RX ADMIN — PANTOPRAZOLE SODIUM 40 MG: 40 INJECTION, POWDER, FOR SOLUTION INTRAVENOUS at 08:37

## 2021-12-22 RX ADMIN — SODIUM CHLORIDE, PRESERVATIVE FREE 10 ML: 5 INJECTION INTRAVENOUS at 08:37

## 2021-12-22 RX ADMIN — ACETAMINOPHEN 650 MG: 325 TABLET ORAL at 00:36

## 2021-12-22 ASSESSMENT — PAIN DESCRIPTION - PROGRESSION: CLINICAL_PROGRESSION: NOT CHANGED

## 2021-12-22 ASSESSMENT — PAIN DESCRIPTION - FREQUENCY: FREQUENCY: CONTINUOUS

## 2021-12-22 ASSESSMENT — PAIN DESCRIPTION - PAIN TYPE: TYPE: ACUTE PAIN

## 2021-12-22 ASSESSMENT — PAIN DESCRIPTION - ORIENTATION: ORIENTATION: RIGHT

## 2021-12-22 ASSESSMENT — PAIN - FUNCTIONAL ASSESSMENT: PAIN_FUNCTIONAL_ASSESSMENT: ACTIVITIES ARE NOT PREVENTED

## 2021-12-22 ASSESSMENT — PAIN SCALES - GENERAL
PAINLEVEL_OUTOF10: 0
PAINLEVEL_OUTOF10: 3

## 2021-12-22 ASSESSMENT — PAIN DESCRIPTION - LOCATION: LOCATION: NECK

## 2021-12-22 ASSESSMENT — PAIN DESCRIPTION - ONSET: ONSET: ON-GOING

## 2021-12-22 ASSESSMENT — PAIN DESCRIPTION - DESCRIPTORS: DESCRIPTORS: ACHING

## 2021-12-22 NOTE — PROGRESS NOTES
ENT Daily Progress Note  David Shafer  CC:  Pituatary mass      Subjective :No acute events overnight. Patient states pain is well controlled. No complaints this morning. Objective    Infusions:   sodium chloride      sodium chloride Stopped (12/20/21 1507)        I/O:I/O last 3 completed shifts: In: 960 [P.O.:960]  Out: -            Wt Readings from Last 1 Encounters:   12/15/21 200 lb (90.7 kg)                 LABS:    Recent Labs     12/20/21  0857 12/21/21  0611   WBC 11.6* 15.1*   HGB 12.9* 11.8*   HCT 38.5* 36.1*   MCV 80.1 80.4    391        Recent Labs     12/20/21  0855 12/21/21  0611   * 134*   K 4.8 4.6   CL 98* 99   CO2 30 28   BUN 18 28*   CREATININE 0.9 1.0      No results for input(s): AST, ALT, ALB, BILIDIR, BILITOT, ALKPHOS in the last 72 hours. No results for input(s): LIPASE, AMYLASE in the last 72 hours. No results for input(s): PROT, INR, APTT in the last 72 hours. No results for input(s): CKTOTAL, CKMB, CKMBINDEX, TROPONINI in the last 72 hours. Exam:BP (!) 144/94   Pulse 64   Temp 98.1 °F (36.7 °C) (Oral)   Resp 16   SpO2 98%   General appearance: alert, appears stated age and cooperative  Eyes: No scleral icterus, left eye lid droop compared to the right  Neck: trachea midline, no JVD, no lymphadenopathy, neck supple, right neck with marked swelling that is soft and improving, no erythema or induration, no drainage, incision is well approximated, c/di  Chest/Lungs: Normal effort with no accessory muscle use on RA  Cardiovascular: RRR, extremities well perfused  Abdomen: Soft, non-tender, non-distended, no rebound, guarding, or rigidity.   Skin: warm and dry, no rashes  Extremities: no edema, no cyanosis  Genitourinary: Grossly normal  Neuro: A&Ox3, no focal deficits, sensation intact    ASSESSMENT/PLAN: Pt. is a 60 y.o. 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     - follow up bx  - OK to place ice on incision for swelling, will continue to monitor, swelling improved from yesterday  - further management per primary team  - okay to d/c from ENT standpoint      Soumya Monterroso MD 12/22/2021 6:31 AM  [phone]

## 2021-12-22 NOTE — PLAN OF CARE
Problem: Falls - Risk of:  Goal: Will remain free from falls  Description: Will remain free from falls  12/22/2021 1105 by Steffen Novoa RN  Outcome: Ongoing   All fall precautions in place. Bed locked and in lowest position with alarm on. Over-bed table and personal belongings within reach. Patient instructed to use call light for assistance. Non-skids socks on. Will continue to monitor. Problem: Pain:  Goal: Control of acute pain  Description: Control of acute pain  Outcome: Ongoing   Patient denies pain at this time. Will continue to assess and monitor.

## 2021-12-22 NOTE — CARE COORDINATION
Patient plans on returning to [place] and will have assistance from his daughter in finding care there. He has a flight scheduled on Friday.      Waiting on biopsy results    Electronically signed by Bryson Ramos RN on 12/22/21 at 11:35 AM EST  [phone]

## 2021-12-22 NOTE — PROGRESS NOTES
Patient alert and oriented x4, VSS on room air, neuro status unchanged. Surgical incision to R neck is ABIGAIL, CD&I. Patient denies need for pain medication at this time. Patient up to bathroom, voiding w/o difficulty. Ambulated x3 laps around the unit this shift, x1 SBA w/ GB - steady gait noted. Tolerating PO fluids and meals, free from N/V. All fall precautions in place. Will continue to monitor.

## 2021-12-22 NOTE — PROGRESS NOTES
Patient provides verbal consent for his radiological imaging to be sent via secure email to his daughter Enrike ([phone]).

## 2021-12-22 NOTE — PLAN OF CARE
Patient discharged per instructions from my partner Dr. Piper Robin  Full dc summary per Dr. Piper Robin

## 2021-12-22 NOTE — PROGRESS NOTES
Neurosurgery Progress Note    Patient seen and examined on 12/22/21. No acute events overnight. Neurologically intact on exam. MRI with large mass in sphenoid sinus with invasion of cavernous sinus on left and bony destruction. CT chest/abd/pelvis with numerous nodules/lymphadenopathy of unclear etiology. A/P: 61 yo smoker with headache. Imaging with large enhancing mass in sphenoid/ethmoid sinuses with cavernous sinus and sellar extension.    -Neuro stable  -Q4H neuro checks  -HOB elevated  -Pain control with PO meds  -Pituitary labs WNL  -ENT following. Excisional biopsy right neck mass for flow and pathology and Nasal endoscopy with removal of tissue sphenoid sinus done 12/20- path pending  -Oncology following   -GI following for stomach ulcer. On PPI. Will hold on steroids for now. -Diet: per primary   -DVT ppx per primary, ok for SQ heparin from [address] standpoint   -Patient to f/u with oncology on biopsy results. He wishes to return to [place]. Please re engage neurosurgery here if needed. We will sign off at this time. DISPO: up to primary team when medically stable for discharge     Patient was discussed with Dr. Judi Stern who agrees with assessment and plan.     Electronically signed by MARIA ALEJANDRA Fuller NP on 12/22/2021 at 9:57 AM  [phone]

## 2021-12-22 NOTE — PLAN OF CARE
Problem: Falls - Risk of:  Goal: Will remain free from falls  Description: Will remain free from falls  Outcome: Ongoing   Patient has remained free of falls. 2/4 bed rails up, bed locked and in lowest position, call light within reach. Patient instructed on use of call light and uses appropriately. Bed alarm on. Non-skid footwear and fall band on. Problem: Pain:  Goal: Pain level will decrease  Description: Pain level will decrease  Outcome: Ongoing   Numeric pain rating scale being used. Patient repositioned for comfort. Ice applied.  Patient is tolerating PO tylenol

## 2021-12-23 NOTE — PROGRESS NOTES
Patient discharged. All discharge instructions reviewed with patient. PIV x1 removed w/o complications. Patient dressed in his own clothing and all personal belongings gathered. Patient left unit with his friend for transportation.

## 2021-12-23 NOTE — DISCHARGE SUMMARY
Hospital Medicine Discharge Summary    Patient ID: Anjana lAfaro      Patient's PCP: No primary care provider on file. 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 Hospital Course: Patient is 57-year-old gentleman with history of tobacco abuse came into ER with a complaint of headache, abdominal pain, hematemesis for 2 weeks. 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 CT abdomen pelvis showed 2.2 cm conglomerate density right lung mass, right neck adenopathy, irregularity in gastric antrum suspicious for ulcer  CT neck soft tissue extensive necrotic and hypervascular enhancing mass or lymphadenopathy in the right neck.     S/P EXCISION BIOPSY RIGHT NECK MASS  NASAL ENDOSCOPY WITH REMOVAL TISSUE RIGHT SPHENOID SINUS on 12/20     Discussed with patient and daughter over the phone. They are planning to follow-up with oncology in [place]. And they are not planning to stay in town to get any chemotherapy. Biopsy pathology is pending at time of discharge. Daughter said she will follow up from my chart and will schedule appointment with oncology in [place]. Biopsy reported as squamous cell carcinoma. I called patient's daughter on 12/23 updated her regarding report. And advised her to follow-up with oncology.       Physical Exam Performed:     /74   Pulse 95   Temp 98.5 °F (36.9 °C) (Oral)   Resp 18   SpO2 96%       Please refer to progress note done on day of discharge      Labs: For convenience and continuity at follow-up the following most recent labs are provided:      CBC:    Lab Results   Component Value Date    WBC 11.9 12/22/2021    HGB 12.1 12/22/2021    HCT 36.7 12/22/2021     12/22/2021       Renal:    Lab Results   Component Value Date     12/22/2021    K 4.8 12/22/2021    CL 95 12/22/2021    CO2 28 12/22/2021    BUN 18 12/22/2021    CREATININE 0.9 12/22/2021    CALCIUM 9.2 12/22/2021         Significant Diagnostic Studies    Radiology:   CT SOFT TISSUE NECK W CONTRAST   Final Result      Extensive necrotic in hypervascular enhancing masses or lymphadenopathy in the right neck extending all the way from the  space to the supraclavicular region with extensive enhancing solid mass or lymphadenopathy displacing vascular structures    and displacing sternocleidomastoid muscle and possibly involving some of the sternocleidomastoid muscle, as described above. This could be head neck cancer or metastatic disease      Lytic bony destruction affecting the clivus and skull base with soft tissue mass in the posterior nasopharynx and clivus which may be metastatic disease      CTA HEAD W WO CONTRAST   Final Result      1. Normal-appearing intracranial vasculature with no encasement of the middle cerebral or anterior cerebral arterial structures with slight displacement mainly of the left cavernous carotid by the sinus and clivus mass in the skull base        2. Unilateral left ethmoid air cell mucosal thickening, expansion of the sphenoid sinus and clivus with a soft tissue mass measuring up to 4.1 x 2.9 x 3.2 cm. The mass extends anteriorly to the posterior aspect of the nasal cavity posteriorly breaching    the cortex of the clivus at the prepontine cistern without signs of any dural based extension. The posterior margin the clivus is breached.  The mass destroys portions of the clivus and there is erosion of the floor of the pituitary fossa and erosion of    the posterior nasal sinus walls and bilateral cavernous sinus walls. Differential considerations would include sinonasal adenocarcinoma, undifferentiated carcinoma or lymphoma or less likely metastasis      CT SINUS FOR IMAGE GUIDANCE   Final Result      1. Normal-appearing intracranial vasculature with no encasement of the middle cerebral or anterior cerebral arterial structures with slight displacement mainly of the left cavernous carotid by the sinus and clivus mass in the skull base        2. Unilateral left ethmoid air cell mucosal thickening, expansion of the sphenoid sinus and clivus with a soft tissue mass measuring up to 4.1 x 2.9 x 3.2 cm. The mass extends anteriorly to the posterior aspect of the nasal cavity posteriorly breaching    the cortex of the clivus at the prepontine cistern without signs of any dural based extension. The posterior margin the clivus is breached. The mass destroys portions of the clivus and there is erosion of the floor of the pituitary fossa and erosion of    the posterior nasal sinus walls and bilateral cavernous sinus walls. Differential considerations would include sinonasal adenocarcinoma, undifferentiated carcinoma or lymphoma or less likely metastasis      MRI BRAIN W WO CONTRAST   Final Result      Enhancing neoplasm with bone destruction 3.7 x 3.2 x 2.7 cm centered at the sphenoid sinus and clivus with left cavernous sinus invasion and possible right cavernous sinus invasion extending anterior posterior from posterior aspect of nasal cavity to the    prepontine cistern causing destruction of floor of sella and mild mass effect on the pituitary gland. 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 Solitary lesion    makes bone metastasis less likely. The intermediate T2 characteristics are not typical for chordoma or chondrosarcoma.                 Consults:     IP CONSULT TO GI  IP CONSULT TO OTOLARYNGOLOGY  IP CONSULT TO NEUROSURGERY  IP CONSULT TO ONCOLOGY    Disposition:   Home    Condition at Discharge: Stable    Discharge Instructions/Follow-up: PCP, oncology    Code Status:  Prior     Activity: activity as tolerated    Diet: regular diet      Discharge Medications:     Discharge Medication List as of 12/22/2021  5:07 PM           Details   pantoprazole (PROTONIX) 40 MG tablet Take 1 tablet by mouth daily, Disp-30 tablet, R-0Normal             Time Spent on discharge is more than 30 minutes in the examination, evaluation, counseling and review of medications and discharge plan. Signed:    Jaime Contreras MD   12/23/2021      Thank you No primary care provider on file. for the opportunity to be involved in this patient's care. If you have any questions or concerns please feel free to contact me at [phone].

## 2022-02-03 LAB
Lab: NORMAL
REPORT: NORMAL
THIS TEST SENT TO: NORMAL

## 2023-01-20 NOTE — PROGRESS NOTES
Hospitalist Progress Note      PCP: No primary care provider on file. Date of Admission: 12/16/2021    Chief Complaint: Headache    Hospital Course:  Patient is 57-year-old gentleman with history of tobacco abuse came into ER with a complaint of headache, abdominal pain, hematemesis for 2 weeks. 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 CT abdomen pelvis showed 2.2 cm conglomerate density right lung mass, right neck adenopathy, irregularity in gastric antrum suspicious for ulcer  CT neck soft tissue extensive necrotic and hypervascular enhancing mass or lymphadenopathy in the right neck. S/P EXCISION BIOPSY RIGHT NECK MASS  NASAL ENDOSCOPY WITH REMOVAL TISSUE RIGHT SPHENOID SINUS on 12/20    Subjective  Seen and examined today. Denies any nausea, vomiting, fever, chills. No acute event reported overnight. .    Medications:  Reviewed    Infusion Medications    sodium chloride      sodium chloride Stopped (12/20/21 1507)     Scheduled Medications    melatonin  5 mg Oral Nightly    pantoprazole  40 mg IntraVENous Daily    sodium chloride flush  10 mL IntraVENous 2 times per day     PRN Meds: meperidine, HYDROmorphone, HYDROmorphone, morphine, HYDROmorphone, labetalol, hydrALAZINE, morphine, diphenhydrAMINE, promethazine, sodium chloride flush, sodium chloride, ondansetron, polyethylene glycol, acetaminophen **OR** acetaminophen      Intake/Output Summary (Last 24 hours) at 12/22/2021 1129  Last data filed at 12/22/2021 0930  Gross per 24 hour   Intake 1200 ml   Output --   Net 1200 ml       Physical Exam Performed:    /74   Pulse 70   Temp 98.1 °F (36.7 °C) (Oral)   Resp 16   SpO2 94%     General appearance: NAD, sleepy but arousable. Appears stated age and cooperative.   HEENT: Drooping of left eyelid, , PERRLA  Neck: Supple, right-sided firm neck Patient to ed with complaints of vaginal bleeding which started this am, patient reports she had a positive home pregnancy test. mass surgical site glued C/D/I  Respiratory:  Normal respiratory effort. Clear to auscultation, bilaterally without Rales/Wheezes/Rhonchi. Cardiovascular: Regular rate and rhythm with normal S1/S2 without murmurs, rubs or gallops. Abdomen: Soft, non-tender, non-distended with normal bowel sounds. Musculoskeletal: No clubbing, cyanosis or edema bilaterally. Full range of motion without deformity. Skin: Skin color, texture, turgor normal.  No rashes or lesions. Neurologic:  Neurovascularly intact without any focal sensory/motor deficits. Cranial nerves: II-XII intact, grossly non-focal.  Psychiatric: Alert and oriented, thought content appropriate, normal insight  Capillary Refill: Brisk,3 seconds, normal   Peripheral Pulses: +2 palpable, equal bilaterally       Labs:   Recent Labs     12/20/21  0857 12/21/21  0611 12/22/21  0631   WBC 11.6* 15.1* 11.9*   HGB 12.9* 11.8* 12.1*   HCT 38.5* 36.1* 36.7*    391 415     Recent Labs     12/20/21  0855 12/21/21  0611 12/22/21  0631   * 134* 133*   K 4.8 4.6 4.8   CL 98* 99 95*   CO2 30 28 28   BUN 18 28* 18   CREATININE 0.9 1.0 0.9   CALCIUM 8.9 9.4 9.2     No results for input(s): AST, ALT, BILIDIR, BILITOT, ALKPHOS in the last 72 hours. No results for input(s): INR in the last 72 hours. No results for input(s): Kathyrn Belch in the last 72 hours. Urinalysis:    No results found for: Cathlene Shank, BACTERIA, RBCUA, BLOODU, [address], [place] [address]    Radiology:  CT SOFT TISSUE NECK W CONTRAST   Final Result      Extensive necrotic in hypervascular enhancing masses or lymphadenopathy in the right neck extending all the way from the  space to the supraclavicular region with extensive enhancing solid mass or lymphadenopathy displacing vascular structures    and displacing sternocleidomastoid muscle and possibly involving some of the sternocleidomastoid muscle, as described above.  This could be head neck cancer or metastatic disease      Lytic erosion of    the posterior nasal sinus walls and bilateral cavernous sinus walls. Differential considerations would include sinonasal adenocarcinoma, undifferentiated carcinoma or lymphoma or less likely metastasis      MRI BRAIN W WO CONTRAST   Final Result      Enhancing neoplasm with bone destruction 3.7 x 3.2 x 2.7 cm centered at the sphenoid sinus and clivus with left cavernous sinus invasion and possible right cavernous sinus invasion extending anterior posterior from posterior aspect of nasal cavity to the    prepontine cistern causing destruction of floor of sella and mild mass effect on the pituitary gland. 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 Solitary lesion    makes bone metastasis less likely. The intermediate T2 characteristics are not typical for chordoma or chondrosarcoma. 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  -MRI showing enhancing neoplasm with bone destruction 3.7 x 3.2 x 2.7 cm centered at the sphenoid sinus and clivus with left cavernous sinus invasion and possible right cavernous sinus invasion extending anterior posterior from posterior aspect of nasal cavity to the    prepontine cistern causing destruction of floor of sella and mild mass effect on the pituitary gland  -CT soft tissue of the neck showing Extensive necrotic in hypervascular enhancing masses or lymphadenopathy in the right neck extending all the way from the  space to the supraclavicular region with extensive enhancing solid mass or lymphadenopathy displacing vascular structures   and displacing sternocleidomastoid muscle and possibly involving some of the sternocleidomastoid muscle  -S/pEXCISION BIOPSY RIGHT NECK MASS  NASAL ENDOSCOPY WITH REMOVAL TISSUE RIGHT SPHENOID SINUS sent follow-up on pathology.   -ENT, neurosurgery and oncology on board      Hematemesis not thought related to ulcer secondary to NSAID use  -GI following  -Continue PPI twice daily  -No plans for intervention at this time    Hyponatremia  -Continue to monitor    Diet: ADULT DIET; Regular  Code Status: Full Code    Dispo -inpatient. Follow-up on pathology from biopsy.     Silvia Leslie MD     This chart was likely completed using voice recognition technology and may contain unintended grammatical , phraseology,and/or punctuation errors

## (undated) DEVICE — DRAPE IRRIG FLD WRM W44XL44IN W/ AORN STD PRTBL INTRATEMP

## (undated) DEVICE — ELECTRODE PT RET AD L9FT HI MOIST COND ADH HYDRGEL CORDED

## (undated) DEVICE — GLOVE ORANGE PI 7 1/2   MSG9075

## (undated) DEVICE — SHEATH 1912000 5PK 4MM/0DEG STORZ XOMED: Brand: ENDO-SCRUB®

## (undated) DEVICE — NEEDLE SPNL 25GA L3.5IN BLU HUB S STL REG WALL FIT STYL W/

## (undated) DEVICE — SOLUTION IV 1000ML 0.9% SOD CHL

## (undated) DEVICE — TOWEL,OR,DSP,ST,BLUE,DLX,8/PK,10PK/CS: Brand: MEDLINE

## (undated) DEVICE — SUTURE PERMA-HAND SZ 3-0 L144IN NONABSORBABLE BLK LIGAPAK LA54G

## (undated) DEVICE — SOLUTION IV 250ML 0.9% SOD CHL PH 5 INJ USP VIAFLX PLAS

## (undated) DEVICE — PENCIL ES ULT VAC W TELSCP NOSE EZ CLN BLDE 10FT

## (undated) DEVICE — INTENDED FOR TISSUE SEPARATION, AND OTHER PROCEDURES THAT REQUIRE A SHARP SURGICAL BLADE TO PUNCTURE OR CUT.: Brand: BARD-PARKER ® CARBON RIB-BACK BLADES

## (undated) DEVICE — HEAD & NECK: Brand: MEDLINE INDUSTRIES, INC.

## (undated) DEVICE — TUBING, SUCTION, 1/4" X 12', STRAIGHT: Brand: MEDLINE

## (undated) DEVICE — SUTURE VCRL SZ 3-0 L27IN ABSRB UD L26MM SH 1/2 CIR J416H

## (undated) DEVICE — NASAL FESS: Brand: MEDLINE INDUSTRIES, INC.

## (undated) DEVICE — FIRM 8CM: Brand: NASOPORE

## (undated) DEVICE — BLADE 1884004HR TRICUT 5PK M4 4MM ROTATE: Brand: TRICUT

## (undated) DEVICE — SUTURE MCRYL SZ 5-0 L18IN ABSRB UD L19MM PS-2 3/8 CIR PRIM Y495G

## (undated) DEVICE — DRAPE,INSTRUMENT,MAGNETIC,10X16: Brand: MEDLINE